# Patient Record
Sex: FEMALE | Race: WHITE | NOT HISPANIC OR LATINO | Employment: OTHER
[De-identification: names, ages, dates, MRNs, and addresses within clinical notes are randomized per-mention and may not be internally consistent; named-entity substitution may affect disease eponyms.]

---

## 2017-06-17 ENCOUNTER — HEALTH MAINTENANCE LETTER (OUTPATIENT)
Age: 42
End: 2017-06-17

## 2017-10-03 LAB — PAP SMEAR - HIM PATIENT REPORTED: NEGATIVE

## 2018-02-09 ENCOUNTER — TELEPHONE (OUTPATIENT)
Dept: FAMILY MEDICINE | Facility: CLINIC | Age: 43
End: 2018-02-09

## 2018-02-09 NOTE — TELEPHONE ENCOUNTER
"Pt calling c/o Nasal congestion, PND, production cough \"thick mucous\".  Cough is worse at night or when laying down   Symptoms for 5 days.     Denies fever, no SOB, wheezing, chest tightness or pain.     Advised Add Neti-pot or sinus wash 2-4 X day.  Mucinex for congestion.  Tylenol not to exceed 4000 mg/24 hours or ibuprofen not to exceed 3200mg/24 hours.  Warm compress to the face. Push fluids. Can add OTC Nasalcort AQ or flonase.  Can try OTC Robitussin at hs for cough if keeping you awake.     Be seen if not improving, with fever or respiratory symptoms.     Pt expressed understanding and acceptance of the plan.  Pt had no further questions at this time.  Advised can call back to clinic at any time with concerns.     Nguyen Shetty RN    "

## 2018-02-12 ENCOUNTER — OFFICE VISIT (OUTPATIENT)
Dept: FAMILY MEDICINE | Facility: CLINIC | Age: 43
End: 2018-02-12
Payer: COMMERCIAL

## 2018-02-12 ENCOUNTER — TELEPHONE (OUTPATIENT)
Dept: FAMILY MEDICINE | Facility: CLINIC | Age: 43
End: 2018-02-12

## 2018-02-12 VITALS
DIASTOLIC BLOOD PRESSURE: 68 MMHG | SYSTOLIC BLOOD PRESSURE: 100 MMHG | HEART RATE: 83 BPM | OXYGEN SATURATION: 99 % | TEMPERATURE: 98 F

## 2018-02-12 DIAGNOSIS — H10.33 ACUTE BACTERIAL CONJUNCTIVITIS OF BOTH EYES: Primary | ICD-10-CM

## 2018-02-12 DIAGNOSIS — R05.8 PRODUCTIVE COUGH: ICD-10-CM

## 2018-02-12 PROCEDURE — 99203 OFFICE O/P NEW LOW 30 MIN: CPT | Performed by: FAMILY MEDICINE

## 2018-02-12 RX ORDER — AZITHROMYCIN 250 MG/1
TABLET, FILM COATED ORAL
Qty: 6 TABLET | Refills: 0 | Status: SHIPPED | OUTPATIENT
Start: 2018-02-12 | End: 2018-03-02

## 2018-02-12 RX ORDER — POLYMYXIN B SULFATE AND TRIMETHOPRIM 1; 10000 MG/ML; [USP'U]/ML
1 SOLUTION OPHTHALMIC EVERY 4 HOURS
Qty: 10 ML | Refills: 0 | Status: SHIPPED | OUTPATIENT
Start: 2018-02-12 | End: 2018-02-18

## 2018-02-12 NOTE — NURSING NOTE
"Chief Complaint   Patient presents with     Urgent Care     Eye Problem     Possible bilateral pink eye started yestesday. Household has been having on going pink eye     URI     Severe Cough has been going on for a while       Initial /68 (BP Location: Right arm, Patient Position: Chair, Cuff Size: Adult Regular)  Pulse 83  Temp 98  F (36.7  C) (Oral)  SpO2 99% Estimated body mass index is 19.84 kg/(m^2) as calculated from the following:    Height as of 1/31/11: 5' 2.25\" (1.581 m).    Weight as of 2/9/13: 109 lb 5.6 oz (49.6 kg).  Medication Reconciliation: complete     Inge Fletcher CMA (MADDISON)        "

## 2018-02-12 NOTE — PROGRESS NOTES
SUBJECTIVE:   Chief Complaint   Patient presents with     Urgent Care     Eye Problem     Possible bilateral pink eye started yestesday. Household has been having on going pink eye     URI     Severe Cough has been going on for a while     Aria Cuevas is a 42 year old female with burning, redness, discharge and mattering in both eyes for 2 days.  No other symptoms.  No significant prior ophthalmological history. No change in visual acuity, no photophobia, no severe eye pain.      No foreign body sensation,  No allergic itching of nose or bilateral eyes.  No irritation from make-up, skin or hair care products    Patient does not  use contact lenses.  Her 2 sons developed similar pink eye in the 2 days prior to her eye symptoms.    Also has had cough, productive in AM, dry in pm without fevers/ chills/ sweats,  No headache, no bodyache.  Has been present 1 week, has some improvement, but persisting    Past Medical History:   Diagnosis Date     NO ACTIVE PROBLEMS        ALLERGIES:  No known drug allergies      Current Outpatient Prescriptions on File Prior to Visit:  NO ACTIVE MEDICATIONS      No current facility-administered medications on file prior to visit.     Social History   Substance Use Topics     Smoking status: Never Smoker     Smokeless tobacco: Not on file     Alcohol use No       Family History   Problem Relation Age of Onset     Family History Negative       CANCER Mother      skin         ROS:  CONSTITUTIONAL:NEGATIVE for fever, chills,    INTEGUMENTARY/SKIN: NEGATIVE for worrisome rashes,    EYES: NEGATIVE for vision changes    ENT/MOUTH: NEGATIVE for ear, mouth and throat problems  GI: NEGATIVE for nausea, abdominal pain,      OBJECTIVE:   /68 (BP Location: Right arm, Patient Position: Chair, Cuff Size: Adult Regular)  Pulse 83  Temp 98  F (36.7  C) (Oral)  SpO2 99%    Patient appears well, vitals signs are normal. Eyes: both eyes with findings of typical conjunctivitis noted;  erythema and discharge. PERRLA, no foreign body noted. No periorbital cellulitis. The corneas are clear and fundi normal. Visual acuity normal.     Nose:   clear discharge  Face:  No sinus tenderness  Ears: no erythema, no swelling of the bilateral ear canals.  TM's pearly bilateral  Mouth:  Pharynx, no erythema, no swelling  Neck: no cervical lymphadenopathy  Lungs:  Clear to auscultation, no wheezes, no rhonchi  Heart:  Reg. Rate and rhythm, no murmur    ASSESSMENT:   Acute bacterial conjunctivitis of both eyes     - trimethoprim-polymyxin b (POLYTRIM) ophthalmic solution; Apply 1 drop to eye every 4 hours for 6 days    Antibiotic drops per order. Hygiene discussed- frequent hand washing and to not share towels, washcloths or pillows to prevent transmission within the household.   Call prn.    Productive cough     - azithromycin (ZITHROMAX) 250 MG tablet; 2 tablets day 1 then 1 tablet daily for 4 days    Will monitor symptoms for about 3 days- if worsening will start azithromycin,  If symptoms mostly resolved she will not take the antibiotic  Has albuterol inhaler at home if needed

## 2018-02-12 NOTE — TELEPHONE ENCOUNTER
Patient calling and states her boys had pink eye and today she woke up and her eyes are crusted shut and red.  Wondering if needs visit.  Has not been seen since 1/31/11.  Advised visit.  Has noon meeting.  Found 10:15 am with Dr. Carpenter so will come right over.  Also  so wants to get drops started ASAP.  Leonora Clarke RN

## 2018-02-12 NOTE — MR AVS SNAPSHOT
After Visit Summary   2/12/2018    Aria Cuevas    MRN: [id number]           Patient Information     Date Of Birth          1975        Visit Information        Provider Department      2/12/2018 2:15 PM Jennifer Evans MD [facility]        Today's Diagnoses     Acute bacterial conjunctivitis of both eyes    -  1    Productive cough          Care Instructions      Conjunctivitis, Bacterial    You have an infection in the membranes covering the white part of the eye. This part of the eye is called the conjunctiva. The infection is called conjunctivitis. The most common symptoms of conjunctivitis include a thick, pus-like discharge from the eye, swollen eyelids, redness, eyelids sticking together upon awakening, and a gritty or scratchy feeling in the eye. Your infection was caused by bacteria. It may be treated with medicine. With treatment, the infection takes about 7 to 10 days to resolve.  Home care    Use prescribed antibiotic eye drops or ointment as directed to treat the infection.    Apply a warm compress (towel soaked in warm water) to the affected eye 3 to 4 times a day. Do this just before applying medicine to the eye.    Use a warm, wet cloth to wipe away crusting of the eyelids in the morning. This is caused by mucus drainage during the night. You may also use saline irrigating solution or artificial tears to rinse away mucus in the eye. Do not put a patch over the eye.    Wash your hands before and after touching the infected eye. This is to prevent spreading the infection to the other eye, and to other people. Don't share your towels or washcloths with others.    You may use acetaminophen or ibuprofen to control pain, unless another medicine was prescribed. (Note: If you have chronic liver or kidney disease or have ever had a stomach ulcer or gastrointestinal bleeding, talk with your doctor before using these medicines.)    Don't wear contact lenses until  your eyes have healed and all symptoms are gone.  Follow-up care  Follow up with your healthcare provider, or as advised.  When to seek medical advice  Call your healthcare provider right away if any of these occur:    Worsening vision    Increasing pain in the eye    Increasing swelling or redness of the eyelid    Redness spreading around the eye  Date Last Reviewed: 6/14/2015 2000-2017 [employer]. [address] [place] [address], [place] [address]. All rights reserved. This information is not intended as a substitute for professional medical care. Always follow your healthcare professional's instructions.        What Is Acute Bronchitis?  Acute bronchitis is when the airways in your lungs (bronchial tubes) become red and swollen (inflamed). It is usually caused by a viral infection. But it can also occur because of a bacteria or allergen. Symptoms include a cough that produces yellow or greenish mucus and can last for days or sometimes weeks.  Inside healthy lungs    Air travels in and out of the lungs through the airways. The linings of these airways produce sticky mucus. This mucus traps particles that enter the lungs. Tiny structures called cilia then sweep the particles out of the airways.     Healthy airway: Airways are normally open. Air moves in and out easily.      Healthy cilia: Tiny, hairlike cilia sweep mucus and particles up and out of the airways.   Lungs with bronchitis  Bronchitis often occurs with a cold or the flu virus. The airways become inflamed (red and swollen). There is a deep hacking cough from the extra mucus. Other symptoms may include:    Wheezing    Chest discomfort    Shortness of breath    Mild fever  A second infection, this time due to bacteria, may then occur. And airways irritated by allergens or smoke are more likely to get infected.        Inflamed airway: Inflammation and extra mucus narrow the airway, causing shortness of breath.      Impaired cilia: Extra mucus  impairs cilia, causing congestion and wheezing. Smoking makes the problem worse.   Making a diagnosis  A physical exam, health history, and certain tests help your healthcare provider make the diagnosis.  Health history  Your healthcare provider will ask you about your symptoms.  The exam  Your provider listens to your chest for signs of congestion. He or she may also check your ears, nose, and throat.  Possible tests    A sputum test for bacteria. This requires a sample of mucus from your lungs.    A nasal or throat swab. This tests to see if you have a bacterial infection.    A chest X-ray. This is done if your healthcare provider thinks you have pneumonia.    Tests to check for an underlying condition. Other tests may be done to check for things such as allergies, asthma, or COPD (chronic obstructive pulmonary disease). You may need to see a specialist for more lung function testing.  Treating a cough  The main treatment for bronchitis is easing symptoms. Avoiding smoke, allergens, and other things that trigger coughing can often help. If the infection is bacterial, you may be given antibiotics. During the illness, it's important to get plenty of sleep. To ease symptoms:    Don t smoke. Also avoid secondhand smoke.    Use a humidifier. Or try breathing in steam from a hot shower. This may help loosen mucus.    Drink a lot of water and juice. They can soothe the throat and may help thin mucus.    Sit up or use extra pillows when in bed. This helps to lessen coughing and congestion.    Ask your provider about using medicine. Ask about using cough medicine, pain and fever medicine, or a decongestant.  Antibiotics  Most cases of bronchitis are caused by cold or flu viruses. They don t need antibiotics to treat them, even if your mucus is thick and green or yellow. Antibiotics don t treat viral illness and antibiotics have not been shown to have any benefit in cases of acute bronchitis. Taking antibiotics when they  are not needed increases your risk of getting an infection later that is antibiotic-resistant. Antibiotics can also cause severe cases of diarrhea that require other antibiotics to treat.  It is important that you accept your healthcare provider's opinion to not use antibiotics. Your provider will prescribe antibiotics if the infection is caused by bacteria. If they are prescribed:    Take all of the medicine. Take the medicine until it is used up, even if symptoms have improved. If you don t, the bronchitis may come back.    Take the medicines as directed. For instance, some medicines should be taken with food.    Ask about side effects. Ask your provider or pharmacist what side effects are common, and what to do about them.  Follow-up care  You should see your provider again in 2 to 3 weeks. By this time, symptoms should have improved. An infection that lasts longer may mean you have a more serious problem.  Prevention    Avoid tobacco smoke. If you smoke, quit. Stay away from smoky places. Ask friends and family not to smoke around you, or in your home or car.    Get checked for allergies.    Ask your provider about getting a yearly flu shot. Also ask about pneumococcal or pneumonia shots.    Wash your hands often. This helps reduce the chance of picking up viruses that cause colds and flu.  Call your healthcare provider if:    Symptoms worsen, or you have new symptoms    Breathing problems worsen or  become severe    Symptoms don t get better within a week, or within 3 days of taking antibiotics   Date Last Reviewed: 2/1/2017 2000-2017 [employer]. [address] [place] [address], [place] [address]. All rights reserved. This information is not intended as a substitute for professional medical care. Always follow your healthcare professional's instructions.                Follow-ups after your visit        Who to contact     If you have questions or need follow up information about today's clinic visit or  your schedule please contact [facility] directly at [phone].  Normal or non-critical lab and imaging results will be communicated to you by MyChart, letter or phone within 4 business days after the clinic has received the results. If you do not hear from us within 7 days, please contact the clinic through [facility] or phone. If you have a critical or abnormal lab result, we will notify you by phone as soon as possible.  Submit refill requests through [facility] or call your pharmacy and they will forward the refill request to us. Please allow 3 business days for your refill to be completed.          Additional Information About Your Visit        [facility] Information     [facility] gives you secure access to your electronic health record. If you see a primary care provider, you can also send messages to your care team and make appointments. If you have questions, please call your primary care clinic.  If you do not have a primary care provider, please call [phone] and they will assist you.        Care EveryWhere ID     This is your Care EveryWhere ID. This could be used by other organizations to access your [place] medical records  [id number]        Your Vitals Were     Pulse Temperature Pulse Oximetry             83 98  F (36.7  C) (Oral) 99%          Blood Pressure from Last 3 Encounters:   02/12/18 100/68   02/09/13 99/71   01/31/11 90/60    Weight from Last 3 Encounters:   02/09/13 109 lb 5.6 oz (49.6 kg)   01/31/11 105 lb (47.6 kg)   06/18/10 105 lb (47.6 kg)              Today, you had the following     No orders found for display         Today's Medication Changes          These changes are accurate as of 2/12/18  2:37 PM.  If you have any questions, ask your nurse or doctor.               Start taking these medicines.        Dose/Directions    azithromycin 250 MG tablet   Commonly known as:  ZITHROMAX   Used for:  Productive cough   Started by:  Jennifer Evans MD        2 tablets day  1 then 1 tablet daily for 4 days   Quantity:  6 tablet   Refills:  0       trimethoprim-polymyxin b ophthalmic solution   Commonly known as:  POLYTRIM   Used for:  Acute bacterial conjunctivitis of both eyes   Started by:  Jennifer Evans MD        Dose:  1 drop   Apply 1 drop to eye every 4 hours for 6 days   Quantity:  10 mL   Refills:  0            Where to get your medicines      These medications were sent to [place] [address] [place] [facility] [place] [address] [facility]  [address] [facility] [address]    Hours:  Tech issues with their phone system Phone:  [phone]     trimethoprim-polymyxin b ophthalmic solution         Some of these will need a paper prescription and others can be bought over the counter.  Ask your nurse if you have questions.     Bring a paper prescription for each of these medications     azithromycin 250 MG tablet                Primary Care Provider Office Phone # Fax #    Boogie Miller [phone]       [facility] [address] [facility] [address]        Equal Access to Services     MORGAN WILL AH: Hadii aad ku hadasho Soomaali, waaxda luqadaha, qaybta kaalmada adeegyada, waxay idiin hayaan adeeg kharash la'akuan ah. So [facility] [phone].    ATENCIÓN: Si habla español, tiene a torres disposición servicios gratuitos de asistencia lingüística. [facility] [phone].    We comply with applicable federal civil rights laws and [place] laws. We do not discriminate on the basis of race, color, national origin, age, disability, sex, sexual orientation, or gender identity.            Thank you!     Thank you for choosing [facility]  for your care. Our goal is always to provide you with excellent care. Hearing back from our patients is one way we can continue to improve our services. Please take a few minutes to complete the written survey that you may receive in the mail after your visit with us. Thank you!             Your Updated Medication  List - Protect others around you: Learn how to safely use, store and throw away your medicines at www.disposemymeds.org.          This list is accurate as of 2/12/18  2:37 PM.  Always use your most recent med list.                   Brand Name Dispense Instructions for use Diagnosis    azithromycin 250 MG tablet    ZITHROMAX    6 tablet    2 tablets day 1 then 1 tablet daily for 4 days    Productive cough       NO ACTIVE MEDICATIONS           trimethoprim-polymyxin b ophthalmic solution    POLYTRIM    10 mL    Apply 1 drop to eye every 4 hours for 6 days    Acute bacterial conjunctivitis of both eyes

## 2018-02-12 NOTE — PATIENT INSTRUCTIONS
Conjunctivitis, Bacterial    You have an infection in the membranes covering the white part of the eye. This part of the eye is called the conjunctiva. The infection is called conjunctivitis. The most common symptoms of conjunctivitis include a thick, pus-like discharge from the eye, swollen eyelids, redness, eyelids sticking together upon awakening, and a gritty or scratchy feeling in the eye. Your infection was caused by bacteria. It may be treated with medicine. With treatment, the infection takes about 7 to 10 days to resolve.  Home care    Use prescribed antibiotic eye drops or ointment as directed to treat the infection.    Apply a warm compress (towel soaked in warm water) to the affected eye 3 to 4 times a day. Do this just before applying medicine to the eye.    Use a warm, wet cloth to wipe away crusting of the eyelids in the morning. This is caused by mucus drainage during the night. You may also use saline irrigating solution or artificial tears to rinse away mucus in the eye. Do not put a patch over the eye.    Wash your hands before and after touching the infected eye. This is to prevent spreading the infection to the other eye, and to other people. Don't share your towels or washcloths with others.    You may use acetaminophen or ibuprofen to control pain, unless another medicine was prescribed. (Note: If you have chronic liver or kidney disease or have ever had a stomach ulcer or gastrointestinal bleeding, talk with your doctor before using these medicines.)    Don't wear contact lenses until your eyes have healed and all symptoms are gone.  Follow-up care  Follow up with your healthcare provider, or as advised.  When to seek medical advice  Call your healthcare provider right away if any of these occur:    Worsening vision    Increasing pain in the eye    Increasing swelling or redness of the eyelid    Redness spreading around the eye  Date Last Reviewed: 6/14/2015 2000-2017 [employer]. [address] [place] [address], [place] [address]. All rights reserved. This information is not intended as a substitute for professional medical care. Always follow your healthcare professional's instructions.        What Is Acute Bronchitis?  Acute bronchitis is when the airways in your lungs (bronchial tubes) become red and swollen (inflamed). It is usually caused by a viral infection. But it can also occur because of a bacteria or allergen. Symptoms include a cough that produces yellow or greenish mucus and can last for days or sometimes weeks.  Inside healthy lungs    Air travels in and out of the lungs through the airways. The linings of these airways produce sticky mucus. This mucus traps particles that enter the lungs. Tiny structures called cilia then sweep the particles out of the airways.     Healthy airway: Airways are normally open. Air moves in and out easily.      Healthy cilia: Tiny, hairlike cilia sweep mucus and particles up and out of the airways.   Lungs with bronchitis  Bronchitis often occurs with a cold or the flu virus. The airways become inflamed (red and swollen). There is a deep hacking cough from the extra mucus. Other symptoms may include:    Wheezing    Chest discomfort    Shortness of breath    Mild fever  A second infection, this time due to bacteria, may then occur. And airways irritated by allergens or smoke are more likely to get infected.        Inflamed airway: Inflammation and extra mucus narrow the airway, causing shortness of breath.      Impaired cilia: Extra mucus impairs cilia, causing congestion and wheezing. Smoking makes the problem worse.   Making a diagnosis  A physical exam, health history, and certain tests help your healthcare provider make the diagnosis.  Health history  Your healthcare provider will ask you about your symptoms.  The exam  Your provider listens to your chest for signs of congestion. He or she may also check your ears, nose, and  throat.  Possible tests    A sputum test for bacteria. This requires a sample of mucus from your lungs.    A nasal or throat swab. This tests to see if you have a bacterial infection.    A chest X-ray. This is done if your healthcare provider thinks you have pneumonia.    Tests to check for an underlying condition. Other tests may be done to check for things such as allergies, asthma, or COPD (chronic obstructive pulmonary disease). You may need to see a specialist for more lung function testing.  Treating a cough  The main treatment for bronchitis is easing symptoms. Avoiding smoke, allergens, and other things that trigger coughing can often help. If the infection is bacterial, you may be given antibiotics. During the illness, it's important to get plenty of sleep. To ease symptoms:    Don t smoke. Also avoid secondhand smoke.    Use a humidifier. Or try breathing in steam from a hot shower. This may help loosen mucus.    Drink a lot of water and juice. They can soothe the throat and may help thin mucus.    Sit up or use extra pillows when in bed. This helps to lessen coughing and congestion.    Ask your provider about using medicine. Ask about using cough medicine, pain and fever medicine, or a decongestant.  Antibiotics  Most cases of bronchitis are caused by cold or flu viruses. They don t need antibiotics to treat them, even if your mucus is thick and green or yellow. Antibiotics don t treat viral illness and antibiotics have not been shown to have any benefit in cases of acute bronchitis. Taking antibiotics when they are not needed increases your risk of getting an infection later that is antibiotic-resistant. Antibiotics can also cause severe cases of diarrhea that require other antibiotics to treat.  It is important that you accept your healthcare provider's opinion to not use antibiotics. Your provider will prescribe antibiotics if the infection is caused by bacteria. If they are prescribed:    Take all of  the medicine. Take the medicine until it is used up, even if symptoms have improved. If you don t, the bronchitis may come back.    Take the medicines as directed. For instance, some medicines should be taken with food.    Ask about side effects. Ask your provider or pharmacist what side effects are common, and what to do about them.  Follow-up care  You should see your provider again in 2 to 3 weeks. By this time, symptoms should have improved. An infection that lasts longer may mean you have a more serious problem.  Prevention    Avoid tobacco smoke. If you smoke, quit. Stay away from smoky places. Ask friends and family not to smoke around you, or in your home or car.    Get checked for allergies.    Ask your provider about getting a yearly flu shot. Also ask about pneumococcal or pneumonia shots.    Wash your hands often. This helps reduce the chance of picking up viruses that cause colds and flu.  Call your healthcare provider if:    Symptoms worsen, or you have new symptoms    Breathing problems worsen or  become severe    Symptoms don t get better within a week, or within 3 days of taking antibiotics   Date Last Reviewed: 2/1/2017 2000-2017 [employer]. [address] [place] [address]. All rights reserved. This information is not intended as a substitute for professional medical care. Always follow your healthcare professional's instructions.

## 2018-03-02 ENCOUNTER — OFFICE VISIT (OUTPATIENT)
Dept: FAMILY MEDICINE | Facility: CLINIC | Age: 43
End: 2018-03-02
Payer: COMMERCIAL

## 2018-03-02 VITALS
DIASTOLIC BLOOD PRESSURE: 60 MMHG | BODY MASS INDEX: 20.24 KG/M2 | SYSTOLIC BLOOD PRESSURE: 90 MMHG | TEMPERATURE: 98.1 F | HEIGHT: 62 IN | WEIGHT: 110 LBS

## 2018-03-02 DIAGNOSIS — H10.33 ACUTE BACTERIAL CONJUNCTIVITIS OF BOTH EYES: Primary | ICD-10-CM

## 2018-03-02 PROCEDURE — 99213 OFFICE O/P EST LOW 20 MIN: CPT | Performed by: NURSE PRACTITIONER

## 2018-03-02 RX ORDER — TOBRAMYCIN 3 MG/ML
1 SOLUTION/ DROPS OPHTHALMIC 4 TIMES DAILY
COMMUNITY
End: 2018-07-10

## 2018-03-02 RX ORDER — OFLOXACIN 3 MG/ML
1 SOLUTION/ DROPS OPHTHALMIC EVERY 4 HOURS
Qty: 1 BOTTLE | Refills: 0 | Status: SHIPPED | OUTPATIENT
Start: 2018-03-02 | End: 2018-07-10

## 2018-03-02 NOTE — NURSING NOTE
"Chief Complaint   Patient presents with     Conjunctivitis       Initial BP 90/60 (BP Location: Right arm, Patient Position: Chair, Cuff Size: Adult Regular)  Temp 98.1  F (36.7  C) (Oral)  Ht 5' 2.25\" (1.581 m)  Wt 110 lb (49.9 kg)  LMP 02/27/2018 (Exact Date)  Breastfeeding? No  BMI 19.96 kg/m2 Estimated body mass index is 19.96 kg/(m^2) as calculated from the following:    Height as of this encounter: 5' 2.25\" (1.581 m).    Weight as of this encounter: 110 lb (49.9 kg).  Medication Reconciliation: complete      Health Maintenance addressed:  Pap Smear    N/a    ESTELITA Azul      "

## 2018-03-02 NOTE — PROGRESS NOTES
"  SUBJECTIVE:   Aria Cuevas is a 42 year old female who presents to clinic today for the following health issues:      Eye(s) Problem  Onset: yesterday morning, but had pink eye 3 weeks ago and doesn't feel like it cleared     Description:   Location: bilateral  Pain: no   Redness: YES    Accompanying Signs & Symptoms:  Discharge/mattering: YES  Swelling: YES  Visual changes: no   Fever: no   Nasal Congestion: no   Bothered by bright lights: no     History:   Trauma: no   Foreign body exposure: no     Precipitating factors:   Wearing contacts: YES- but not today     Alleviating factors:  Improved by:     Therapies Tried and outcome: eye drops from last episode   Bilateral eye redness and was treated with Tobradex and used it for three days. Felt burning and more irritation with the medication. Has some discharge in the morning with redness.       Problem list and histories reviewed & adjusted, as indicated.  Additional history: none    Patient Active Problem List   Diagnosis     Zoster     CARDIOVASCULAR SCREENING; LDL GOAL LESS THAN 160     Past Surgical History:   Procedure Laterality Date     NO HISTORY OF SURGERY         Social History   Substance Use Topics     Smoking status: Never Smoker     Smokeless tobacco: Never Used     Alcohol use No     Family History   Problem Relation Age of Onset     Family History Negative       CANCER Mother      skin           Reviewed and updated as needed this visit by clinical staff  Tobacco  Allergies  Meds  Problems  Med Hx  Surg Hx  Fam Hx  Soc Hx        Reviewed and updated as needed this visit by Provider  Allergies  Meds  Problems  Med Hx  Surg Hx  Fam Hx         ROS:  Constitutional, HEENT, cardiovascular, pulmonary, gi and gu systems are negative, except as otherwise noted.    OBJECTIVE:     BP 90/60 (BP Location: Right arm, Patient Position: Chair, Cuff Size: Adult Regular)  Temp 98.1  F (36.7  C) (Oral)  Ht 5' 2.25\" (1.581 m)  Wt 110 lb (49.9 " kg)  LMP 02/27/2018 (Exact Date)  Breastfeeding? No  BMI 19.96 kg/m2  Body mass index is 19.96 kg/(m^2).  GENERAL: healthy, alert and no distress  EYES: erythematous conjunctiva left is worse than right.   PSYCH: mentation appears normal, affect normal/bright        ASSESSMENT/PLAN:             1. Acute bacterial conjunctivitis of both eyes  Encouraged good hand hygiene, washing linen on bed, and not having the tip of the medication bottle touch the eye. Use according to directions and complete entire course. If no improvement, will need to see opthalmology.   - ofloxacin (OCUFLOX) 0.3 % ophthalmic solution; Apply 1 drop to eye every 4 hours  Dispense: 1 Bottle; Refill: 0        Linda Yi NP  [facility]

## 2018-03-02 NOTE — MR AVS SNAPSHOT
"              After Visit Summary   3/2/2018    Aria Cuevas    MRN: [id number]           Patient Information     Date Of Birth          1975        Visit Information        Provider Department      3/2/2018 2:45 PM Linda Yi NP [facility]        Today's Diagnoses     Acute bacterial conjunctivitis of both eyes    -  1       Follow-ups after your visit        Who to contact     If you have questions or need follow up information about today's clinic visit or your schedule please contact [facility] directly at [phone].  Normal or non-critical lab and imaging results will be communicated to you by [employer], letter or phone within 4 business days after the clinic has received the results. If you do not hear from us within 7 days, please contact the clinic through [employer] or phone. If you have a critical or abnormal lab result, we will notify you by phone as soon as possible.  Submit refill requests through [email or web address] or call your pharmacy and they will forward the refill request to us. Please allow 3 business days for your refill to be completed.          Additional Information About Your Visit        MyChart Information     [email or web address] gives you secure access to your electronic health record. If you see a primary care provider, you can also send messages to your care team and make appointments. If you have questions, please call your primary care clinic.  If you do not have a primary care provider, please call [phone] and they will assist you.        Care EveryWhere ID     This is your Care EveryWhere ID. This could be used by other organizations to access your [facility] medical records  [id number]        Your Vitals Were     Temperature Height Last Period Breastfeeding? BMI (Body Mass Index)       98.1  F (36.7  C) (Oral) 5' 2.25\" (1.581 m) 02/27/2018 (Exact Date) No 19.96 kg/m2        Blood Pressure from Last 3 Encounters:   03/02/18 90/60   02/12/18 100/68 "   02/09/13 99/71    Weight from Last 3 Encounters:   03/02/18 110 lb (49.9 kg)   02/09/13 109 lb 5.6 oz (49.6 kg)   01/31/11 105 lb (47.6 kg)              Today, you had the following     No orders found for display         Today's Medication Changes          These changes are accurate as of 3/2/18  3:12 PM.  If you have any questions, ask your nurse or doctor.               Start taking these medicines.        Dose/Directions    ofloxacin 0.3 % ophthalmic solution   Commonly known as:  OCUFLOX   Used for:  Acute bacterial conjunctivitis of both eyes   Started by:  Linda Yi NP        Dose:  1 drop   Apply 1 drop to eye every 4 hours   Quantity:  1 Bottle   Refills:  0            Where to get your medicines      These medications were sent to [place] [id number] [address]    Hours:  Tech issues with their phone system Phone:  [phone]     ofloxacin 0.3 % ophthalmic solution                Primary Care Provider Office Phone # Fax #    Boogie Miller [phone]       [facility] [address] [facility] [address]        Equal Access to Services     MORGAN WILL AH: Hadii nathan jordan hadasho Soomaali, waaxda luqadaha, qaybta kaalmada adeegyada, yamilet barba. So [facility] [phone].    ATENCIÓN: Si habla español, tiene a torres disposición servicios gratuitos de asistencia lingüística. Llame al [phone].    We comply with applicable federal civil rights laws and [place] laws. We do not discriminate on the basis of race, color, national origin, age, disability, sex, sexual orientation, or gender identity.            Thank you!     Thank you for choosing [facility]  for your care. Our goal is always to provide you with excellent care. Hearing back from our patients is one way we can continue to improve our services. Please take a few minutes to complete the written survey  that you may receive in the mail after your visit with us. Thank you!             Your Updated Medication List - Protect others around you: Learn how to safely use, store and throw away your medicines at www.disposemymeds.org.          This list is accurate as of 3/2/18  3:12 PM.  Always use your most recent med list.                   Brand Name Dispense Instructions for use Diagnosis    NO ACTIVE MEDICATIONS           ofloxacin 0.3 % ophthalmic solution    OCUFLOX    1 Bottle    Apply 1 drop to eye every 4 hours    Acute bacterial conjunctivitis of both eyes       tobramycin 0.3 % ophthalmic solution    TOBREX     1 drop 4 times daily

## 2018-05-22 ENCOUNTER — TELEPHONE (OUTPATIENT)
Dept: FAMILY MEDICINE | Facility: CLINIC | Age: 43
End: 2018-05-22

## 2018-05-22 NOTE — TELEPHONE ENCOUNTER
Panel Management Review      Patient has the following on her problem list: None      Composite cancer screening  Chart review shows that this patient is due/due soon for the following Pap Smear  Summary:    Patient is due/failing the following:   PAP    Action needed:   Patient needs office visit for physical w/pap.    Type of outreach:    Phone, spoke to patient.  pt uses [facility] last pap Feb 2018 normal  02/02/2018    Questions for provider review:    None                                                                                                                                    ESTELITA Azul       Chart routed to  .

## 2018-07-10 ENCOUNTER — OFFICE VISIT (OUTPATIENT)
Dept: FAMILY MEDICINE | Facility: CLINIC | Age: 43
End: 2018-07-10
Payer: COMMERCIAL

## 2018-07-10 VITALS
TEMPERATURE: 97.9 F | DIASTOLIC BLOOD PRESSURE: 75 MMHG | OXYGEN SATURATION: 100 % | SYSTOLIC BLOOD PRESSURE: 108 MMHG | WEIGHT: 108.13 LBS | RESPIRATION RATE: 16 BRPM | BODY MASS INDEX: 19.62 KG/M2 | HEART RATE: 60 BPM

## 2018-07-10 DIAGNOSIS — Z71.84 TRAVEL ADVICE ENCOUNTER: Primary | ICD-10-CM

## 2018-07-10 PROCEDURE — 90632 HEPA VACCINE ADULT IM: CPT | Performed by: FAMILY MEDICINE

## 2018-07-10 PROCEDURE — 90471 IMMUNIZATION ADMIN: CPT | Performed by: FAMILY MEDICINE

## 2018-07-10 PROCEDURE — 90472 IMMUNIZATION ADMIN EACH ADD: CPT | Performed by: FAMILY MEDICINE

## 2018-07-10 PROCEDURE — 90715 TDAP VACCINE 7 YRS/> IM: CPT | Performed by: FAMILY MEDICINE

## 2018-07-10 PROCEDURE — 90691 TYPHOID VACCINE IM: CPT | Performed by: FAMILY MEDICINE

## 2018-07-10 PROCEDURE — 99402 PREV MED CNSL INDIV APPRX 30: CPT | Mod: 25 | Performed by: FAMILY MEDICINE

## 2018-07-10 RX ORDER — AZITHROMYCIN 500 MG/1
TABLET, FILM COATED ORAL
Qty: 3 TABLET | Refills: 0 | Status: SHIPPED | OUTPATIENT
Start: 2018-07-10 | End: 2019-11-12

## 2018-07-10 NOTE — MR AVS SNAPSHOT
After Visit Summary   7/10/2018    Aria Cuevas    MRN: [id number]           Patient Information     Date Of Birth          1975        Visit Information        Provider Department      7/10/2018 2:30 PM Tawanna Villagomez MD [facility]        Today's Diagnoses     Travel advice encounter    -  1      Care Instructions    See travel packet provided  Recommend ultrathon (mosquito repellant), pepto bismol and imodium  Also bring hand  and sun screen with you.  Safe Travels           Follow-ups after your visit        Who to contact     If you have questions or need follow up information about today's clinic visit or your schedule please contact [facility] directly at [phone].  Normal or non-critical lab and imaging results will be communicated to you by MyChart, letter or phone within 4 business days after the clinic has received the results. If you do not hear from us within 7 days, please contact the clinic through MyChart or phone. If you have a critical or abnormal lab result, we will notify you by phone as soon as possible.  Submit refill requests through Jebbit or call your pharmacy and they will forward the refill request to us. Please allow 3 business days for your refill to be completed.          Additional Information About Your Visit        MyChart Information     Jebbit gives you secure access to your electronic health record. If you see a primary care provider, you can also send messages to your care team and make appointments. If you have questions, please call your primary care clinic.  If you do not have a primary care provider, please call [phone] and they will assist you.        Care EveryWhere ID     This is your Care EveryWhere ID. This could be used by other organizations to access your [facility] medical records  [id number]        Your Vitals Were     Pulse Temperature Respirations Last Period Pulse  Oximetry BMI (Body Mass Index)    60 97.9  F (36.6  C) (Oral) 16 07/07/2018 (Exact Date) 100% 19.62 kg/m2       Blood Pressure from Last 3 Encounters:   07/10/18 108/75   03/02/18 90/60   02/12/18 100/68    Weight from Last 3 Encounters:   07/10/18 108 lb 2 oz (49 kg)   03/02/18 110 lb (49.9 kg)   02/09/13 109 lb 5.6 oz (49.6 kg)              Today, you had the following     No orders found for display         Today's Medication Changes          These changes are accurate as of 7/10/18  3:13 PM.  If you have any questions, ask your nurse or doctor.               Start taking these medicines.        Dose/Directions    azithromycin 500 MG tablet   Commonly known as:  ZITHROMAX   Used for:  Travel advice encounter   Started by:  Tawanna Villagomez MD        Take 1 tablet daily x 3 days   Quantity:  3 tablet   Refills:  0         Stop taking these medicines if you haven't already. Please contact your care team if you have questions.     NO ACTIVE MEDICATIONS   Stopped by:  Tawanna Villagomez MD           ofloxacin 0.3 % ophthalmic solution   Commonly known as:  OCUFLOX   Stopped by:  Tawanna Villagomez MD           tobramycin 0.3 % ophthalmic solution   Commonly known as:  TOBREX   Stopped by:  Tawanna Villagomez MD                Where to get your medicines      These medications were sent to [place] [address] [place] [address] [address]    Hours:  Tech issues with their phone system Phone:  [phone]     azithromycin 500 MG tablet                Primary Care Provider Fax #    Physician No Ref-Primary [phone]       No address on file        Equal Access to Services     MORGAN WILL : Roman Connors, delaney theodore, yamilet garcia. So [facility] [phone].    ATENCIÓN: Si habla español, tiene a torres disposición servicios gratuitos de asistencia  lingüística. Yuval al [phone].    We comply with applicable federal civil rights laws and [place] laws. We do not discriminate on the basis of race, color, national origin, age, disability, sex, sexual orientation, or gender identity.            Thank you!     Thank you for choosing [facility]  for your care. Our goal is always to provide you with excellent care. Hearing back from our patients is one way we can continue to improve our services. Please take a few minutes to complete the written survey that you may receive in the mail after your visit with us. Thank you!             Your Updated Medication List - Protect others around you: Learn how to safely use, store and throw away your medicines at www.disposemymeds.org.          This list is accurate as of 7/10/18  3:13 PM.  Always use your most recent med list.                   Brand Name Dispense Instructions for use Diagnosis    azithromycin 500 MG tablet    ZITHROMAX    3 tablet    Take 1 tablet daily x 3 days    Travel advice encounter

## 2018-07-10 NOTE — PATIENT INSTRUCTIONS
See travel packet provided  Recommend ultrathon (mosquito repellant), pepto bismol and imodium  Also bring hand  and sun screen with you.  Safe Travels

## 2018-07-10 NOTE — PROGRESS NOTES
SUBJECTIVE: Aria uCevas , a 42 year old  female, presents for counseling and information regarding upcoming travel to [place]. Special medical concerns   include: none. She anticipates the following unusual exposures: none.       Itinerary: (List all countries)  [place]  Departure Date: 07/14/2018, Return Date: 07/21/2018  Reason for Travel (i.e. business, pleasure): mission trip  Visiting an urban or rural area? rural  Accommodations (i.e. hotel, hostel, friends, family etc.): hotel  Women - First day of your last period: 07/07/2018    IMMUNIZATION HISTORY  Have you received any vaccinations in the past 4 weeks?  No   Have you ever fainted from having your blood drawn or from an injection?  No  Have you ever had a fever reaction to a vaccination?  No  Have you had any bad reaction / side effect from any vaccination?  No  Have you ever had hepatitis A or B vaccine?  No  Do you live (or work closely with anyone who has AIDS, or any other immune disorder, or who is on chemotherapy for cancer or family history of immunodeficiency?  No  Have you received any injection of immune globulin or any blood products during the past 12 months?  No    GENERAL MEDICAL HISTORY  Do you have a medical condition that warrants maintenance meds or physician follow-up?  No  Do you have a medical condition that is stable now, but that may recur while traveling?  No  Has your spleen been removed?   No  Have you had an acute illness or a fever in the past 48 hours?  No  Are you pregnant or might you become pregnant on this trip? Any chance of pregnancy?  No  Are you breastfeeding?  No  Do you have HIV, AIDS, an AIDS-like condition, any other immune disorder, leukemia or cancer?  No  Do you have a severe combined immunodeficiency disease?  No  Have you had your thymus gland removed or a history of problems with your thymus, such as myasthenia gravis, DiGeorge syndrome, or thymoma?  No  Do you have severe  thrombocytopenia (low platelet count) or blood clotting disorder?  No  Have you ever had a convulsion, seizure, epilepsy, neurologic condition or brain infection?  No  Do you have any stomach conditions?  No  Do you have a G6PD deficiency?  No  Do you have severe renal or kidney impairment?  No  Do you have a history of psychiatric problems?  No  Do you have a problem with strange dreams and/or nightmares?  No  Do you have insomnia?  No  Do you have problems with vaginitis?  No  Do you have psoriasis?  No  Are you prone to motion sickness?  No  Have you ever had headaches, nausea, vomiting or breathing problems from altitude exposure?  No    MEDICATIONS  ARE YOU TAKING:  Steroids, prednisone, or anti-cancer drugs?  No  Antibiotics or sulfonamides?  No  Oral contraceptives?  No  Aspirin therapy? (children & adolescents)  No    ALLERGIES  ARE YOU ALLERGIC TO:  Any medications?  No  Any foods or other?  No     Patient Active Problem List   Diagnosis     Zoster     CARDIOVASCULAR SCREENING; LDL GOAL LESS THAN 160         Past Medical History:   Diagnosis Date     NO ACTIVE PROBLEMS       Immunization History   Administered Date(s) Administered     HepA-Adult 07/10/2018     TDAP Vaccine (Adacel) 07/10/2018     Typhoid IM 07/10/2018       Current Outpatient Prescriptions   Medication Sig Dispense Refill     azithromycin (ZITHROMAX) 500 MG tablet Take 1 tablet daily x 3 days 3 tablet 0     Allergies   Allergen Reactions     No Known Drug Allergies         EXAM: deferred    Immunizations discussed include: Hepatitis A, Typhoid and Tetanus/Diphtheria  Malaraia prophylaxis recommended: n/a  Symptomatic treatment for traveler's diarrhea: azithromycin     ASSESSMENT/PLAN:  (Z71.89) Travel advice encounter  (primary encounter diagnosis)  Comment:   Plan: azithromycin (ZITHROMAX) 500 MG tablet, TYPHOID        VACCINE, IM, HEPA VACCINE ADULT IM, TDAP         VACCINE (ADACEL)      I have reviewed general recommendations for safe  travel   including: food/water precautions, insect avoidance, safe sex   practices given high prevalence of HIV and other STDs,   roadway safety. Educational materials and links to the CDC   Traveler's health website have been provided.    Total time 30 minutes, greater than 50 percent in counseling   and coordination of care.    Tawanna Harris MD   [facility]

## 2018-12-21 ENCOUNTER — TELEPHONE (OUTPATIENT)
Dept: FAMILY MEDICINE | Facility: CLINIC | Age: 43
End: 2018-12-21

## 2018-12-21 NOTE — TELEPHONE ENCOUNTER
Panel Management Review      Patient has the following on her problem list: None      Composite cancer screening  Chart review shows that this patient is due/due soon for the following Pap Smear  Summary:    Patient is due/failing the following:   PAP    Action needed:   Patient needs office visit for PE/PAP.    Type of outreach:    Phone, spoke to patient.  she states had normal pap with [facility] in 10/2017    Questions for provider review:    None                                                                                                                                    Aminta Mccullough/COTY  [place]       Chart routed to none .

## 2019-05-13 ENCOUNTER — TRANSFERRED RECORDS (OUTPATIENT)
Dept: HEALTH INFORMATION MANAGEMENT | Facility: CLINIC | Age: 44
End: 2019-05-13

## 2019-05-24 ENCOUNTER — THERAPY VISIT (OUTPATIENT)
Dept: PHYSICAL THERAPY | Facility: CLINIC | Age: 44
End: 2019-05-24
Payer: COMMERCIAL

## 2019-05-24 DIAGNOSIS — M54.16 LUMBAR RADICULOPATHY: ICD-10-CM

## 2019-05-24 DIAGNOSIS — S16.1XXD STRAIN OF NECK MUSCLE, SUBSEQUENT ENCOUNTER: ICD-10-CM

## 2019-05-24 PROBLEM — S16.1XXA CERVICAL STRAIN: Status: ACTIVE | Noted: 2019-05-24

## 2019-05-24 PROCEDURE — 97110 THERAPEUTIC EXERCISES: CPT | Mod: GP | Performed by: PHYSICAL THERAPIST

## 2019-05-24 PROCEDURE — 97035 APP MDLTY 1+ULTRASOUND EA 15: CPT | Mod: GP | Performed by: PHYSICAL THERAPIST

## 2019-05-24 PROCEDURE — 97161 PT EVAL LOW COMPLEX 20 MIN: CPT | Mod: GP | Performed by: PHYSICAL THERAPIST

## 2019-05-24 ASSESSMENT — ACTIVITIES OF DAILY LIVING (ADL)
GOING_DOWN_1_FLIGHT_OF_STAIRS: NO DIFFICULTY AT ALL
ROLLING_OVER_IN_BED: NO DIFFICULTY AT ALL
HOS_ADL_ITEM_SCORE_TOTAL: 62
GETTING_INTO_AND_OUT_OF_A_BATHTUB: NO DIFFICULTY AT ALL
WALKING_APPROXIMATELY_10_MINUTES: SLIGHT DIFFICULTY
HOS_ADL_SCORE(%): 91.18
HOS_ADL_HIGHEST_POTENTIAL_SCORE: 68
STEPPING_UP_AND_DOWN_CURBS: NO DIFFICULTY AT ALL
GOING_UP_1_FLIGHT_OF_STAIRS: NO DIFFICULTY AT ALL
HOS_ADL_COUNT: 17
WALKING_INITIALLY: NO DIFFICULTY AT ALL
TWISTING/PIVOTING_ON_INVOLVED_LEG: NO DIFFICULTY AT ALL
WALKING_DOWN_STEEP_HILLS: SLIGHT DIFFICULTY
DEEP_SQUATTING: NO DIFFICULTY AT ALL
WALKING_UP_STEEP_HILLS: SLIGHT DIFFICULTY
HOW_WOULD_YOU_RATE_YOUR_CURRENT_LEVEL_OF_FUNCTION_DURING_YOUR_USUAL_ACTIVITIES_OF_DAILY_LIVING_FROM_0_TO_100_WITH_100_BEING_YOUR_LEVEL_OF_FUNCTION_PRIOR_TO_YOUR_HIP_PROBLEM_AND_0_BEING_THE_INABILITY_TO_PERFORM_ANY_OF_YOUR_USUAL_DAILY_ACTIVITIES?: 90
LIGHT_TO_MODERATE_WORK: NO DIFFICULTY AT ALL
SITTING_FOR_15_MINUTES: SLIGHT DIFFICULTY
RECREATIONAL_ACTIVITIES: SLIGHT DIFFICULTY
HEAVY_WORK: NO DIFFICULTY AT ALL
PUTTING_ON_SOCKS_AND_SHOES: NO DIFFICULTY AT ALL
STANDING_FOR_15_MINUTES: SLIGHT DIFFICULTY
GETTING_INTO_AND_OUT_OF_AN_AVERAGE_CAR: NO DIFFICULTY AT ALL
WALKING_15_MINUTES_OR_GREATER: SLIGHT DIFFICULTY

## 2019-05-24 NOTE — PROGRESS NOTES
[facility] Initial Evaluation  Subjective:  Patient describes right lateral hip pain of insidious onset approximately a year ago.  Has been worse within the last 4 months for unknown reasons locates the pain just inferior to the right iliac crest.  Pain is worse with prolonged sitting especially driving.  Also painful at night and interfering with sleep.  Patient also describes right neck pain just superior to her right clavicle.  She reports an insidious onset of pain approximately 2 months ago.  Possibly a result of weight training where weight lifting.  This pain is worse with lifting overhead, lying on her right side, and lifting arms up to take shirts on and off.    The history is provided by the patient.     Condition occurred with:  Insidious onset.    This is a new condition     Site of Pain: Just inferior to the right iliac crest.  Radiates to: Occasionally radiate right upper thigh.  Pain is described as aching and is intermittent and reported as 7/10.   Pain is worse during the night.  Symptoms are exacerbated by sitting (After activity such as prolonged walking or running) and relieved by rest.  Since onset symptoms are gradually worsening.  Special tests:  MRI (Of right hip showing labral tear).      General health as reported by patient is excellent.                  Barriers include:  None as reported by the patient.    Red flags:  None as reported by the patient.      Aria Cuevas is a 43 year old female with a cervical spine condition.  Condition occurred with:  Lifting.  Condition occurred: during recreation/sport.  This is a new condition     Site of Pain: Right neck just superior to the right clavicle.  Radiates to: Occasionally to the right shoulder.  Pain is described as aching and sharp and is intermittent and reported as 8/10.   Pain is the same all the time.  Symptoms are exacerbated by certain positions and lifting and relieved by rest.  Since onset symptoms are  unchanged.        General health as reported by patient is excellent.                  Barriers include:  None as reported by the patient.    Red flags:  None as reported by the patient.                        Objective:        Flexibility/Screens:   Positive screens:  LumbarNegative screens: Hip or Shoulder                         Cervical/Thoracic Evaluation    AROM:  AROM Cervical:    Flexion:            Within normal limits  Extension:       Within normal limits  Rotation:         Left: Within normal     Right: Within normal limits  Side Bend:      Left: Within normal limit with right neck pain     Right:  Within normal limits      Headaches: none  Cervical Myotomes:  normal                        Cervical Palpation:  : Right first rib.    Tenderness present at Right:    Scalenes    Cervical Stability/Joint Clearing:      Right positive at:  1st Rib           Shoulder Evaluation:  ROM:  AROM:    Flexion:  Right:  Painful motion    Abduction:  Right:  Painful with slight range of motion loss          Horizontal Adduction:  Right:  Painful with slight range of motion loss                  Strength:  normal                                                    Hip Evaluation  Hip PROM:  Hip PROM:  Left Hip:    Normal  Right Hip:  Normal                          Hip Strength:  Hip Strength:    Left:    Normal  Right:  Normal                          Hip Special Testing:      Right hip negative for the following special tests:  Kera or Fadir/Labrum                   Diogenes Lumbar Evaluation    Posture:  Sitting: good  Standing: good  Lordosis: WNL  Lateral Shift: no      Movement Loss:  Flexion (Flex): nil  Extension (EXT): nil  Side Glide R (SG R): nil  Side Glide L (SG L): nil and pain  Test Movements:  FIS: During: no effect  After: no effect  Pretest Movements: Right lat hip pain  Repeat FIS: During: no effect  After: no effect    EIS: During: no effect  After: no effect    Repeat EIS: During: no effect  After:  no effect      EIL:   Repeat EIL: During: no effect  After: no effect    SGIS R: During: decreases  After: no better    Repeat SGIS R: During: decreases  After: better    SGIS L: During: increases  After: no worse    Repeat SGIS L: During: increases  After: worse      Conclusion: derangement  Principle of Treatment:        Lateral: Lateral                                         ROS    Assessment/Plan:    Patient is a 43 year old female with cervical and lumbar complaints.    Patient has the following significant findings with corresponding treatment plan.                Diagnosis 1: Lumbar lateral derangement Pain -  manual therapy, self management, education, directional preference exercise and home program  Diagnosis 2: Right scalene strain with elevated first rib   Pain -  US, manual therapy, education and home program  Decreased ROM/flexibility - manual therapy, therapeutic exercise and home program  Decreased joint mobility - manual therapy, therapeutic exercise and home program    Therapy Evaluation Codes:   1) History comprised of:   Personal factors that impact the plan of care:      None.    Comorbidity factors that impact the plan of care are:      None.     Medications impacting care: None.  2) Examination of Body Systems comprised of:   Body structures and functions that impact the plan of care:      Cervical spine and Lumbar spine.   Activity limitations that impact the plan of care are:      Driving, Lifting, Sitting and Sleeping.  3) Clinical presentation characteristics are:   Stable/Uncomplicated.  4) Decision-Making    Low complexity using standardized patient assessment instrument and/or measureable assessment of functional outcome.  Cumulative Therapy Evaluation is: Low complexity.    Previous and current functional limitations:  (See Goal Flow Sheet for this information)    Short term and Long term goals: (See Goal Flow Sheet for this information)     Communication ability:  Patient appears to  be able to clearly communicate and understand verbal and written communication and follow directions correctly.  Treatment Explanation - The following has been discussed with the patient:   RX ordered/plan of care  Anticipated outcomes  Possible risks and side effects  This patient would benefit from PT intervention to resume normal activities.   Rehab potential is good.    Frequency:  1 X week, once daily  Duration:  for 6 weeks  Discharge Plan:  Achieve all LTG.  Independent in home treatment program.  Reach maximal therapeutic benefit.    Please refer to the daily flowsheet for treatment today, total treatment time and time spent performing 1:1 timed codes.

## 2019-05-24 NOTE — LETTER
[facility]  [address]  [facility] [address]  [phone]    May 24, 2019    Re: Aria Cuevas   :   1975  MRN:  [id number]   REFERRING PHYSICIAN:   Rosalio Walters    [facility]    Date of Initial Evaluation:  2019  Visits:  Rxs Used: 1  Reason for Referral:     Lumbar radiculopathy  Strain of neck muscle, subsequent encounter    EVALUATION SUMMARY    [facility] Initial Evaluation  Subjective:  Patient describes right lateral hip pain of insidious onset approximately a year ago.  Has been worse within the last 4 months for unknown reasons locates the pain just inferior to the right iliac crest.  Pain is worse with prolonged sitting especially driving.  Also painful at night and interfering with sleep.  Patient also describes right neck pain just superior to her right clavicle.  She reports an insidious onset of pain approximately 2 months ago.  Possibly a result of weight training where weight lifting.  This pain is worse with lifting overhead, lying on her right side, and lifting arms up to take shirts on and off.  The history is provided by the patient.   Condition occurred with:  Insidious onset.    This is a new condition     Site of Pain: Just inferior to the right iliac crest.  Radiates to: Occasionally radiate right upper thigh.  Pain is described as aching and is intermittent and reported as 7/10.   Pain is worse during the night.  Symptoms are exacerbated by sitting (After activity such as prolonged walking or running) and relieved by rest.  Since onset symptoms are gradually worsening.  Special tests:  MRI (Of right hip showing labral tear).  General health as reported by patient is excellent. Barriers include:  None as reported by the patient. Red flags:  None as reported by the patient.  Aria Cuevas is a 43 year old female with a cervical spine condition.  Condition occurred with:   Lifting.  Condition occurred: during recreation/sport.  This is a new condition     Site of Pain: Right neck just superior to the right clavicle.  Radiates to: Occasionally to the right shoulder.  Pain is described as aching and sharp and is intermittent and reported as 8/10.   Pain is the same all the time.  Symptoms are exacerbated by certain positions and lifting and relieved by rest.  Since onset symptoms are unchanged.        General health as reported by patient is excellent. Barriers include:  None as reported by the patient. Red flags:  None as reported by the patient.  Objective:  Flexibility/Screens:   Positive screens:  LumbarNegative screens: Hip or Shoulder     Cervical/Thoracic Evaluation  AROM:  AROM Cervical:  Flexion:            Within normal limits  Extension:       Within normal limits  Rotation:         Left: Within normal     Right: Within normal limits  Side Bend:      Left: Within normal limit with right neck pain     Right:  Within normal limits  Headaches: none  Cervical Myotomes:  normal  Cervical Palpation:  : Right first rib.  Tenderness present at Right:    Scalenes  Cervical Stability/Joint Clearing:    Right positive at:  1st Rib  Shoulder Evaluation:  ROM:  AROM:    Flexion:  Right:  Painful motion  Abduction:  Right:  Painful with slight range of motion loss  Horizontal Adduction:  Right:  Painful with slight range of motion loss  Strength:  normal  Hip Evaluation  Hip PROM:  Hip PROM:  Left Hip:    Normal  Right Hip:  Normal  Hip Strength:  Hip Strength:    Left:    Normal  Right:  Normal  Hip Special Testing:    Right hip negative for the following special tests:  Kera or Fadir/Labrum    Diogenes Lumbar Evaluation  Posture:  Sitting: good  Standing: good  Lordosis: WNL  Lateral Shift: no  Movement Loss:  Flexion (Flex): nil  Extension (EXT): nil  Side Glide R (SG R): nil  Side Glide L (SG L): nil and pain  Test Movements:  FIS: During: no effect  After: no effect  Pretest  Movements: Right lat hip pain  Repeat FIS: During: no effect  After: no effect    EIS: During: no effect  After: no effect    Repeat EIS: During: no effect  After: no effect    EIL:   Repeat EIL: During: no effect  After: no effect    SGIS R: During: decreases  After: no better    Repeat SGIS R: During: decreases  After: better    SGIS L: During: increases  After: no worse    Repeat SGIS L: During: increases  After: worse    Conclusion: derangement  Principle of Treatment:  Lateral: Lateral     Assessment/Plan:    Patient is a 43 year old female with cervical and lumbar complaints.    Patient has the following significant findings with corresponding treatment plan.                Diagnosis 1: Lumbar lateral derangement Pain -  manual therapy, self management, education, directional preference exercise and home program  Diagnosis 2: Right scalene strain with elevated first rib   Pain -  US, manual therapy, education and home program  Decreased ROM/flexibility - manual therapy, therapeutic exercise and home program  Decreased joint mobility - manual therapy, therapeutic exercise and home program    Therapy Evaluation Codes:   1) History comprised of:   Personal factors that impact the plan of care:      None.    Comorbidity factors that impact the plan of care are:      None.     Medications impacting care: None.  2) Examination of Body Systems comprised of:   Body structures and functions that impact the plan of care:      Cervical spine and Lumbar spine.   Activity limitations that impact the plan of care are:      Driving, Lifting, Sitting and Sleeping.  3) Clinical presentation characteristics are:   Stable/Uncomplicated.  4) Decision-Making    Low complexity using standardized patient assessment instrument and/or measureable assessment of functional outcome.  Cumulative Therapy Evaluation is: Low complexity.  Previous and current functional limitations:  (See Goal Flow Sheet for this information)    Short term  and Long term goals: (See Goal Flow Sheet for this information)   Communication ability:  Patient appears to be able to clearly communicate and understand verbal and written communication and follow directions correctly.  Treatment Explanation - The following has been discussed with the patient:   RX ordered/plan of care  Anticipated outcomes, Possible risks and side effects  This patient would benefit from PT intervention to resume normal activities.   Rehab potential is good.  Frequency:  1 X week, once daily  Duration:  for 6 weeks  Discharge Plan:  Achieve all LTG.  Independent in home treatment program.  Reach maximal therapeutic benefit.    Thank you for your referral.    INQUIRIES  Therapist: Martinez Ba, PT   [facility] [place]  [address] [facility] [address]  Phone: [phone]  Fax: [phone]

## 2019-05-31 ENCOUNTER — THERAPY VISIT (OUTPATIENT)
Dept: PHYSICAL THERAPY | Facility: CLINIC | Age: 44
End: 2019-05-31
Payer: COMMERCIAL

## 2019-05-31 DIAGNOSIS — S16.1XXD STRAIN OF NECK MUSCLE, SUBSEQUENT ENCOUNTER: ICD-10-CM

## 2019-05-31 DIAGNOSIS — M54.16 LUMBAR RADICULOPATHY: ICD-10-CM

## 2019-05-31 PROCEDURE — 97110 THERAPEUTIC EXERCISES: CPT | Mod: GP

## 2019-05-31 PROCEDURE — 97140 MANUAL THERAPY 1/> REGIONS: CPT | Mod: GP

## 2019-05-31 PROCEDURE — 97035 APP MDLTY 1+ULTRASOUND EA 15: CPT | Mod: GP

## 2019-06-10 ENCOUNTER — THERAPY VISIT (OUTPATIENT)
Dept: PHYSICAL THERAPY | Facility: CLINIC | Age: 44
End: 2019-06-10
Payer: COMMERCIAL

## 2019-06-10 DIAGNOSIS — S16.1XXD STRAIN OF NECK MUSCLE, SUBSEQUENT ENCOUNTER: ICD-10-CM

## 2019-06-10 DIAGNOSIS — M54.16 LUMBAR RADICULOPATHY: ICD-10-CM

## 2019-06-10 PROCEDURE — 97110 THERAPEUTIC EXERCISES: CPT | Mod: GP

## 2019-06-10 PROCEDURE — 97035 APP MDLTY 1+ULTRASOUND EA 15: CPT | Mod: GP

## 2019-06-17 ENCOUNTER — THERAPY VISIT (OUTPATIENT)
Dept: PHYSICAL THERAPY | Facility: CLINIC | Age: 44
End: 2019-06-17
Payer: COMMERCIAL

## 2019-06-17 DIAGNOSIS — M54.16 LUMBAR RADICULOPATHY: ICD-10-CM

## 2019-06-17 DIAGNOSIS — S16.1XXD STRAIN OF NECK MUSCLE, SUBSEQUENT ENCOUNTER: ICD-10-CM

## 2019-06-17 PROCEDURE — 97035 APP MDLTY 1+ULTRASOUND EA 15: CPT | Mod: GP

## 2019-06-17 PROCEDURE — 97110 THERAPEUTIC EXERCISES: CPT | Mod: GP

## 2019-06-17 PROCEDURE — 97140 MANUAL THERAPY 1/> REGIONS: CPT | Mod: GP

## 2019-06-24 ENCOUNTER — THERAPY VISIT (OUTPATIENT)
Dept: PHYSICAL THERAPY | Facility: CLINIC | Age: 44
End: 2019-06-24
Payer: COMMERCIAL

## 2019-06-24 DIAGNOSIS — M54.16 LUMBAR RADICULOPATHY: ICD-10-CM

## 2019-06-24 DIAGNOSIS — S16.1XXD STRAIN OF NECK MUSCLE, SUBSEQUENT ENCOUNTER: ICD-10-CM

## 2019-06-24 PROCEDURE — 97110 THERAPEUTIC EXERCISES: CPT | Mod: GP | Performed by: PHYSICAL THERAPIST

## 2019-06-28 ENCOUNTER — OFFICE VISIT (OUTPATIENT)
Dept: FAMILY MEDICINE | Facility: CLINIC | Age: 44
End: 2019-06-28
Payer: COMMERCIAL

## 2019-06-28 VITALS
WEIGHT: 109.3 LBS | HEIGHT: 62 IN | HEART RATE: 74 BPM | OXYGEN SATURATION: 98 % | RESPIRATION RATE: 16 BRPM | DIASTOLIC BLOOD PRESSURE: 73 MMHG | BODY MASS INDEX: 20.11 KG/M2 | SYSTOLIC BLOOD PRESSURE: 107 MMHG | TEMPERATURE: 98.9 F

## 2019-06-28 DIAGNOSIS — Z71.84 ENCOUNTER FOR COUNSELING FOR TRAVEL: Primary | ICD-10-CM

## 2019-06-28 DIAGNOSIS — Z23 NEED FOR HEPATITIS A VACCINATION: ICD-10-CM

## 2019-06-28 PROCEDURE — 99401 PREV MED CNSL INDIV APPRX 15: CPT | Mod: 25 | Performed by: PHYSICIAN ASSISTANT

## 2019-06-28 PROCEDURE — 90471 IMMUNIZATION ADMIN: CPT | Performed by: PHYSICIAN ASSISTANT

## 2019-06-28 PROCEDURE — 90632 HEPA VACCINE ADULT IM: CPT | Performed by: PHYSICIAN ASSISTANT

## 2019-06-28 RX ORDER — AZITHROMYCIN 500 MG/1
500 TABLET, FILM COATED ORAL DAILY
Qty: 3 TABLET | Refills: 0 | Status: SHIPPED | OUTPATIENT
Start: 2019-06-28 | End: 2019-11-12

## 2019-06-28 RX ORDER — ATOVAQUONE AND PROGUANIL HYDROCHLORIDE 250; 100 MG/1; MG/1
1 TABLET, FILM COATED ORAL DAILY
Qty: 15 TABLET | Refills: 0 | Status: SHIPPED | OUTPATIENT
Start: 2019-06-28 | End: 2019-11-12

## 2019-06-28 ASSESSMENT — MIFFLIN-ST. JEOR: SCORE: 1112.28

## 2019-06-28 NOTE — PATIENT INSTRUCTIONS
See travel packet provided  Recommend ultrathon, pepto bismol and imodium  Also bring hand  and sun screen with you.  Safe Travels       Today June 28, 2019 you received the    Hepatitis A Vaccine - This is your final dose.  .    These appointments can be made as a NURSE ONLY visit.    **It is very important for the vaccinations to be given on the scheduled day(s), this helps ensure you receive the full effectiveness of the vaccine.**    Please call [facility] with any questions [phone]    Thank you for visiting [facility]

## 2019-06-28 NOTE — PROGRESS NOTES
SUBJECTIVE: Aria Cuevas , a 43 year old  female, presents for counseling and information regarding upcoming travel to [facility]. Special medical concerns include: none. She anticipates the following unusual exposures: none.    Itinerary:  [facility]     Departure Date: 7/27/19 Return date: 8/10/19    Reason for travel (i.e. Business, pleasure): pleasure    Visiting an urban or rural area?:  Hotel and resort     Accommodations (i.e. hotel, hostel, friends, family, etc):      Women - First day of your last period: 6/15/19    IMMUNIZATION HISTORY  Have you received any vaccinations in the past 4 weeks?  No  Have you ever fainted from having your blood drawn or from an injection?  No  Have you ever had a fever reaction to vaccination?  No  Have you ever had any bad reaction or side effect from any vaccination?  No  Have you ever had hepatitis A or B vaccine?  Yes  Do you live (or work closely) with anyone who has AIDS, an AIDS-like condition, any other immune disorder or who is on chemotherapy for cancer?  No  Have you received any injection of immune globulin or any blood products during the past 12 months?  No    GENERAL MEDICAL HISTORY  Do you have a medical condition that warrants maintenance medication or physician follow-up?  No  Do you have a medical condition that is stable now, but that may recur while traveling?  No  Has your spleen been removed?  No  Have you had an acute illness or a fever in the past 48 hours?  No  Are you pregnant, or might you become pregnant on this trip?  Any chance of pregnancy?  No  Are you breastfeeding?  No  Do you have HIV, AIDS, an AIDS-like condition, any other immune disorder, leukemia or cancer?  No  Do you have a severe combined immunodeficiency disease?  No  Have you had your thymus gland removed or history of problems with your thymus, such as myasthenia gravis, DiGeorge syndrome, or thymoma?  No    Do you have severe thrombocytopenia (low platelet count) or a  coagulation disorder?  No  Have you ever had a convulsion, seizure, epilepsy, neurologic condition or brain infection?  No  Do you have any stomach conditions?  No  Do you have a G6PD deficiency?  No  Do you have severe renal or kidney impairment?  No  Do you have a history of psychiatric problems?  No  Do you have a problem with strange dreams and/or nightmares?  No  Do you have insomnia?  No  Do you have problems with vaginitis?  No  Do you have psoriasis?  No  Are you prone to motion sickness?  No  Have you ever had headaches, nausea, vomiting, or breathing problems from altitude exposure?  No      Past Medical History:   Diagnosis Date     NO ACTIVE PROBLEMS       Immunization History   Administered Date(s) Administered     HepA-Adult 07/10/2018     TDAP Vaccine (Adacel) 07/10/2018     Typhoid IM 07/10/2018       Current Outpatient Medications   Medication Sig Dispense Refill     azithromycin (ZITHROMAX) 500 MG tablet Take 1 tablet daily x 3 days 3 tablet 0     Allergies   Allergen Reactions     No Known Drug Allergies         EXAM: deferred    Immunizations discussed include: Hepatitis A  Malaria prophylaxis recommended: Malarone  Symptomatic treatment for traveler's diarrhea: bismuth subsalicylate, loperamide/diphenoxylate and azithromycin    ASSESSMENT/PLAN:    (Z71.89) Encounter for counseling for travel  (primary encounter diagnosis)    Comment: Hepatitis A vaccines today. Patient will return or follow-up with PCP as needed. Prophylaxis given for Traveler's diarrhea and Malaria. All questions were answered.     Plan: atovaquone-proguanil (MALARONE) 250-100 MG         tablet, azithromycin (ZITHROMAX) 500 MG tablet            (Z23) Need for hepatitis A vaccination  Comment:   Plan: HEPA VACCINE ADULT IM              I have reviewed general recommendations for safe travel   including: food/water precautions, insect avoidance, safe sex   practices given high prevalence of HIV and other STDs,   roadway safety.  Educational materials and links to the [facility]   Traveler's health website have been provided.    Total time 15 minutes, greater than 50 percent in counseling   and coordination of care.

## 2019-07-08 ENCOUNTER — THERAPY VISIT (OUTPATIENT)
Dept: PHYSICAL THERAPY | Facility: CLINIC | Age: 44
End: 2019-07-08
Payer: COMMERCIAL

## 2019-07-08 DIAGNOSIS — M54.16 LUMBAR RADICULOPATHY: ICD-10-CM

## 2019-07-08 DIAGNOSIS — S16.1XXD STRAIN OF NECK MUSCLE, SUBSEQUENT ENCOUNTER: ICD-10-CM

## 2019-07-08 PROCEDURE — 97035 APP MDLTY 1+ULTRASOUND EA 15: CPT | Mod: GP | Performed by: PHYSICAL THERAPIST

## 2019-07-08 PROCEDURE — 97110 THERAPEUTIC EXERCISES: CPT | Mod: GP | Performed by: PHYSICAL THERAPIST

## 2019-07-08 PROCEDURE — 97140 MANUAL THERAPY 1/> REGIONS: CPT | Mod: GP | Performed by: PHYSICAL THERAPIST

## 2019-07-17 ENCOUNTER — THERAPY VISIT (OUTPATIENT)
Dept: PHYSICAL THERAPY | Facility: CLINIC | Age: 44
End: 2019-07-17
Payer: COMMERCIAL

## 2019-07-17 DIAGNOSIS — M54.16 LUMBAR RADICULOPATHY: ICD-10-CM

## 2019-07-17 DIAGNOSIS — S16.1XXD STRAIN OF NECK MUSCLE, SUBSEQUENT ENCOUNTER: ICD-10-CM

## 2019-07-17 PROCEDURE — 97035 APP MDLTY 1+ULTRASOUND EA 15: CPT | Mod: GP | Performed by: PHYSICAL THERAPIST

## 2019-07-17 PROCEDURE — 97140 MANUAL THERAPY 1/> REGIONS: CPT | Mod: GP | Performed by: PHYSICAL THERAPIST

## 2019-07-24 ENCOUNTER — THERAPY VISIT (OUTPATIENT)
Dept: PHYSICAL THERAPY | Facility: CLINIC | Age: 44
End: 2019-07-24
Payer: COMMERCIAL

## 2019-07-24 DIAGNOSIS — S16.1XXD STRAIN OF NECK MUSCLE, SUBSEQUENT ENCOUNTER: ICD-10-CM

## 2019-07-24 DIAGNOSIS — M54.16 LUMBAR RADICULOPATHY: ICD-10-CM

## 2019-07-24 PROCEDURE — 97035 APP MDLTY 1+ULTRASOUND EA 15: CPT | Mod: GP

## 2019-07-24 PROCEDURE — 97140 MANUAL THERAPY 1/> REGIONS: CPT | Mod: GP

## 2019-07-24 PROCEDURE — 97110 THERAPEUTIC EXERCISES: CPT | Mod: GP

## 2019-08-08 ENCOUNTER — MYC MEDICAL ADVICE (OUTPATIENT)
Dept: FAMILY MEDICINE | Facility: CLINIC | Age: 44
End: 2019-08-08

## 2019-08-16 ENCOUNTER — THERAPY VISIT (OUTPATIENT)
Dept: PHYSICAL THERAPY | Facility: CLINIC | Age: 44
End: 2019-08-16
Payer: COMMERCIAL

## 2019-08-16 DIAGNOSIS — M54.16 LUMBAR RADICULOPATHY: ICD-10-CM

## 2019-08-16 DIAGNOSIS — S16.1XXD STRAIN OF NECK MUSCLE, SUBSEQUENT ENCOUNTER: ICD-10-CM

## 2019-08-16 PROCEDURE — 97110 THERAPEUTIC EXERCISES: CPT | Mod: GP | Performed by: PHYSICAL THERAPIST

## 2019-08-16 NOTE — LETTER
[facility] ATHLETIC [facility]  [address]  [facility] [address]  [phone]    2019    Re: Aria Cuevas   :   1975  MRN:  [id number]   REFERRING PHYSICIAN:   Rosalio Walters    [facility]    Date of Initial Evaluation:  2019  Visits:  Rxs Used: 9  Reason for Referral:     Lumbar radiculopathy  Strain of neck muscle, subsequent encounter    Assessment/Plan:    PROGRESS  REPORT    Progress reporting period is from 19 to 19 (9 visits).       SUBJECTIVE  Subjective changes noted by patient:    Subjective: Pt reports that she had no improvement in her right hip pain following an injection given in July, but she had no hip pain for about 2 weeks while on vacation in the [facility] [place].  Her pain returned the day after returning home from the vacation.  Felt in the morning and with sitting.  Locates the pain in the area of the illiac crest about 2 inches behind the ASIS.  Is also tender to touch in that area.  She states that she has had little to no right neck/shoulder pain for the last 2-3 weeks.  She can lift her arms above her head and carry things with the right arm without pain now.      Current pain level is NA  .     Previous pain level was  Initial Pain level: 8/10.   Changes in function:  Yes (See Goal flowsheet attached for changes in current functional level)  Adverse reaction to treatment or activity: None    OBJECTIVE  Changes noted in objective findings:    Objective: Shoulder and neck ROM are full and painfree.  MMT of neck and shoulder are normal and painfree.  Repeated movements exam of the low back: Flexion is full and no sxs, repeated ext produces right LBP.  Repeated sideglide abolishes sxs.  Suspect lateral derangement of lumbar spine.  Hip exam is WNL.  Is mildly tender to palpation at illiac crest about 2 inches behind the ASIS.       ASSESSMENT/PLAN  Updated problem list and treatment plan: Diagnosis 1:   Scalene/SCM strain with elevated 1st rib    Diagnosis 2:  Suspected lumbar lateral derangement with radiculopathy     STG/LTGs have been met or progress has been made towards goals:  Yes (See Goal flow sheet completed today.)    Assessment of Progress: The patient's condition is improving.  Self Management Plans:  Patient has been instructed in a home treatment program.    Aria continues to require the following intervention to meet STG and LTG's:  PT intervention is no longer required to meet STG/LTG.    Recommendations:  This patient is ready to be discharged from therapy and continue their home treatment program.    Thank you for your referral.    INQUIRIES  Therapist: Martinez Ba, PT  [facility] [place]  [address] [facility] [place] [address]  Phone: [phone]  Fax: [phone]

## 2019-08-16 NOTE — PROGRESS NOTES
Subjective:  HPI                    Objective:  System    Physical Exam    General     ROS    Assessment/Plan:    PROGRESS  REPORT    Progress reporting period is from 5/24/19 to 8/16/19 (9 visits).       SUBJECTIVE  Subjective changes noted by patient:    Subjective: Pt reports that she had no improvement in her right hip pain following an injection given in July, but she had no hip pain for about 2 weeks while on vacation in the [facility].  Her pain returned the day after returning home from the vacation.  Felt in the morning and with sitting.  Locates the pain in the area of the illiac crest about 2 inches behind the ASIS.  Is also tender to touch in that area.  She states that she has had little to no right neck/shoulder pain for the last 2-3 weeks.  She can lift her arms above her head and carry things with the right arm without pain now.      Current pain level is NA  .     Previous pain level was  Initial Pain level: 8/10.   Changes in function:  Yes (See Goal flowsheet attached for changes in current functional level)  Adverse reaction to treatment or activity: None    OBJECTIVE  Changes noted in objective findings:    Objective: Shoulder and neck ROM are full and painfree.  MMT of neck and shoulder are normal and painfree.  Repeated movements exam of the low back: Flexion is full and no sxs, repeated ext produces right LBP.  Repeated sideglide abolishes sxs.  Suspect lateral derangement of lumbar spine.  Hip exam is WNL.  Is mildly tender to palpation at illiac crest about 2 inches behind the ASIS.       ASSESSMENT/PLAN  Updated problem list and treatment plan: Diagnosis 1:  Scalene/SCM strain with elevated 1st rib    Diagnosis 2:  Suspected lumbar lateral derangement with radiculopathy     STG/LTGs have been met or progress has been made towards goals:  Yes (See Goal flow sheet completed today.)  Assessment of Progress: The patient's condition is improving.  Self Management Plans:  Patient has been  instructed in a home treatment program.    Aria continues to require the following intervention to meet STG and LTG's:  PT intervention is no longer required to meet STG/LTG.    Recommendations:  This patient is ready to be discharged from therapy and continue their home treatment program.    Please refer to the daily flowsheet for treatment today, total treatment time and time spent performing 1:1 timed codes.

## 2019-09-04 ENCOUNTER — THERAPY VISIT (OUTPATIENT)
Dept: PHYSICAL THERAPY | Facility: CLINIC | Age: 44
End: 2019-09-04
Payer: COMMERCIAL

## 2019-09-04 DIAGNOSIS — M54.16 LUMBAR RADICULOPATHY: ICD-10-CM

## 2019-09-04 DIAGNOSIS — S16.1XXD STRAIN OF NECK MUSCLE, SUBSEQUENT ENCOUNTER: ICD-10-CM

## 2019-09-04 PROCEDURE — 97110 THERAPEUTIC EXERCISES: CPT | Mod: GP | Performed by: PHYSICAL THERAPIST

## 2019-09-04 PROCEDURE — 97140 MANUAL THERAPY 1/> REGIONS: CPT | Mod: GP | Performed by: PHYSICAL THERAPIST

## 2019-09-11 ENCOUNTER — THERAPY VISIT (OUTPATIENT)
Dept: PHYSICAL THERAPY | Facility: CLINIC | Age: 44
End: 2019-09-11
Payer: COMMERCIAL

## 2019-09-11 DIAGNOSIS — M54.16 LUMBAR RADICULOPATHY: ICD-10-CM

## 2019-09-11 DIAGNOSIS — S16.1XXD STRAIN OF NECK MUSCLE, SUBSEQUENT ENCOUNTER: ICD-10-CM

## 2019-09-11 PROCEDURE — 97035 APP MDLTY 1+ULTRASOUND EA 15: CPT | Mod: GP | Performed by: PHYSICAL THERAPIST

## 2019-09-11 PROCEDURE — 97140 MANUAL THERAPY 1/> REGIONS: CPT | Mod: GP | Performed by: PHYSICAL THERAPIST

## 2019-09-20 PROBLEM — M54.16 LUMBAR RADICULOPATHY: Status: RESOLVED | Noted: 2019-05-24 | Resolved: 2019-09-20

## 2019-09-20 PROBLEM — S16.1XXA CERVICAL STRAIN: Status: RESOLVED | Noted: 2019-05-24 | Resolved: 2019-09-20

## 2019-09-24 ENCOUNTER — THERAPY VISIT (OUTPATIENT)
Dept: PHYSICAL THERAPY | Facility: CLINIC | Age: 44
End: 2019-09-24
Payer: COMMERCIAL

## 2019-09-24 DIAGNOSIS — M54.16 LUMBAR RADICULOPATHY: Primary | ICD-10-CM

## 2019-09-24 DIAGNOSIS — S16.1XXD STRAIN OF NECK MUSCLE, SUBSEQUENT ENCOUNTER: ICD-10-CM

## 2019-09-24 PROCEDURE — 97035 APP MDLTY 1+ULTRASOUND EA 15: CPT | Mod: GP | Performed by: PHYSICAL THERAPIST

## 2019-09-24 PROCEDURE — 97140 MANUAL THERAPY 1/> REGIONS: CPT | Mod: GP | Performed by: PHYSICAL THERAPIST

## 2019-09-24 PROCEDURE — 97110 THERAPEUTIC EXERCISES: CPT | Mod: GP | Performed by: PHYSICAL THERAPIST

## 2019-10-01 ENCOUNTER — HEALTH MAINTENANCE LETTER (OUTPATIENT)
Age: 44
End: 2019-10-01

## 2019-10-02 ENCOUNTER — THERAPY VISIT (OUTPATIENT)
Dept: PHYSICAL THERAPY | Facility: CLINIC | Age: 44
End: 2019-10-02
Payer: COMMERCIAL

## 2019-10-02 DIAGNOSIS — M54.16 LUMBAR RADICULOPATHY: Primary | ICD-10-CM

## 2019-10-02 PROCEDURE — 97035 APP MDLTY 1+ULTRASOUND EA 15: CPT | Mod: GP | Performed by: PHYSICAL THERAPIST

## 2019-10-02 PROCEDURE — 97140 MANUAL THERAPY 1/> REGIONS: CPT | Mod: GP | Performed by: PHYSICAL THERAPIST

## 2019-10-07 ENCOUNTER — THERAPY VISIT (OUTPATIENT)
Dept: PHYSICAL THERAPY | Facility: CLINIC | Age: 44
End: 2019-10-07
Payer: COMMERCIAL

## 2019-10-07 DIAGNOSIS — M54.16 LUMBAR RADICULOPATHY: Primary | ICD-10-CM

## 2019-10-07 PROCEDURE — 97110 THERAPEUTIC EXERCISES: CPT | Mod: GP

## 2019-10-07 PROCEDURE — 97140 MANUAL THERAPY 1/> REGIONS: CPT | Mod: GP

## 2019-10-07 PROCEDURE — 97035 APP MDLTY 1+ULTRASOUND EA 15: CPT | Mod: GP

## 2019-10-11 ENCOUNTER — THERAPY VISIT (OUTPATIENT)
Dept: PHYSICAL THERAPY | Facility: CLINIC | Age: 44
End: 2019-10-11
Payer: COMMERCIAL

## 2019-10-11 DIAGNOSIS — M54.16 LUMBAR RADICULOPATHY: Primary | ICD-10-CM

## 2019-10-11 PROCEDURE — 97140 MANUAL THERAPY 1/> REGIONS: CPT | Mod: GP

## 2019-10-11 PROCEDURE — 97110 THERAPEUTIC EXERCISES: CPT | Mod: GP

## 2019-10-11 PROCEDURE — 97035 APP MDLTY 1+ULTRASOUND EA 15: CPT | Mod: GP

## 2019-10-14 ENCOUNTER — THERAPY VISIT (OUTPATIENT)
Dept: PHYSICAL THERAPY | Facility: CLINIC | Age: 44
End: 2019-10-14
Payer: COMMERCIAL

## 2019-10-14 DIAGNOSIS — M54.16 LUMBAR RADICULOPATHY: Primary | ICD-10-CM

## 2019-10-14 PROCEDURE — 97140 MANUAL THERAPY 1/> REGIONS: CPT | Mod: GP

## 2019-10-14 PROCEDURE — 97110 THERAPEUTIC EXERCISES: CPT | Mod: GP

## 2019-10-14 PROCEDURE — 97035 APP MDLTY 1+ULTRASOUND EA 15: CPT | Mod: GP

## 2019-10-16 ENCOUNTER — THERAPY VISIT (OUTPATIENT)
Dept: PHYSICAL THERAPY | Facility: CLINIC | Age: 44
End: 2019-10-16
Payer: COMMERCIAL

## 2019-10-16 DIAGNOSIS — M25.551 HIP PAIN, RIGHT: ICD-10-CM

## 2019-10-16 PROCEDURE — 97110 THERAPEUTIC EXERCISES: CPT | Mod: GP | Performed by: PHYSICAL THERAPIST

## 2019-10-16 PROCEDURE — 97035 APP MDLTY 1+ULTRASOUND EA 15: CPT | Mod: GP | Performed by: PHYSICAL THERAPIST

## 2019-10-16 PROCEDURE — 97140 MANUAL THERAPY 1/> REGIONS: CPT | Mod: GP | Performed by: PHYSICAL THERAPIST

## 2019-10-22 ENCOUNTER — THERAPY VISIT (OUTPATIENT)
Dept: PHYSICAL THERAPY | Facility: CLINIC | Age: 44
End: 2019-10-22
Payer: COMMERCIAL

## 2019-10-22 DIAGNOSIS — M25.551 HIP PAIN, RIGHT: ICD-10-CM

## 2019-10-22 PROCEDURE — 97140 MANUAL THERAPY 1/> REGIONS: CPT | Mod: GP | Performed by: PHYSICAL THERAPIST

## 2019-10-22 PROCEDURE — 97035 APP MDLTY 1+ULTRASOUND EA 15: CPT | Mod: GP | Performed by: PHYSICAL THERAPIST

## 2019-10-25 ENCOUNTER — THERAPY VISIT (OUTPATIENT)
Dept: PHYSICAL THERAPY | Facility: CLINIC | Age: 44
End: 2019-10-25
Payer: COMMERCIAL

## 2019-10-25 DIAGNOSIS — M25.551 HIP PAIN, RIGHT: ICD-10-CM

## 2019-10-25 PROCEDURE — 97035 APP MDLTY 1+ULTRASOUND EA 15: CPT | Mod: GP

## 2019-10-25 PROCEDURE — 97140 MANUAL THERAPY 1/> REGIONS: CPT | Mod: GP

## 2019-11-04 ENCOUNTER — THERAPY VISIT (OUTPATIENT)
Dept: PHYSICAL THERAPY | Facility: CLINIC | Age: 44
End: 2019-11-04
Payer: COMMERCIAL

## 2019-11-04 DIAGNOSIS — M25.551 HIP PAIN, RIGHT: ICD-10-CM

## 2019-11-04 PROCEDURE — 97164 PT RE-EVAL EST PLAN CARE: CPT | Mod: GP | Performed by: PHYSICAL THERAPIST

## 2019-11-04 PROCEDURE — 97110 THERAPEUTIC EXERCISES: CPT | Mod: GP | Performed by: PHYSICAL THERAPIST

## 2019-11-04 NOTE — LETTER
[facility] ATHLETIC [facility]  [address]  [facility] [address]  [phone]    2019    Re: Aria Cuevas   :   1975  MRN:  [id number]   REFERRING PHYSICIAN:   Sukumar Varghese    [facility]  Date of Initial Evaluation:  10/02/19  Visits:  Rxs Used: 8  Reason for Referral:  Hip pain, right    PROGRESS  REPORT  Progress reporting period is from 10/2/19 to 19 (8 visits).     SUBJECTIVE  Subjective changes noted by patient:  Pt reports that her right hip pain and right LBP have gotten worse over the course of the last 1-2 months.  She describes a constant pain in the right anterior illiac crest area (1-4/10 PL) with intermittent right LBP.  Worse at night and with sitting.  She had an injection on 10/29/19 and reports no improvement in sxs to this point.       Current pain level is 2/10  .     Previous pain level was  NA  .   Changes in function:  None  Adverse reaction to treatment or activity: None  OBJECTIVE  Changes noted in objective findings:  Mild pain to palpation of the medial border of her right illiac crest.  Testing of her lumbar spine: Extension in standing and lying produce right LBP, but no impact on the right hip pain with a repeated movements exam. Normal hip testing.  Normal SI testing.    ASSESSMENT/PLAN  Updated problem list and treatment plan: Diagnosis 1:  Unknown etiology of the right hip pain    STG/LTGs have been met or progress has been made towards goals:  None  Assessment of Progress: The patient's condition is unchanged or worse.  Self Management Plans:  Patient has been instructed in a home treatment program.  Aria continues to require the following intervention to meet STG and LTG's:  PT intervention is no longer required to meet STG/LTG.  Recommendations:  This patient would benefit from further evaluation.  I have advised Dang to seek further medical attention as she has not improved and her hip pain is out  of the ordinary.      Thank you for your referral.  INQUIRIES  Therapist: Martinez Ba PT  [place] [facility] [place]  [address] [facility] [address]  Phone: [phone]  Fax: [phone]

## 2019-11-04 NOTE — LETTER
[facility] ATHLETIC [facility]  [address]  [facility] [address]  [phone]    2019    Re: Aria Cuevas   :   1975  MRN:  [id number]   REFERRING PHYSICIAN:   Rosalio Walters    [facility] ATHLET[facility]  Date of Initial Evaluation:  10/2/2019  Visits:  Rxs Used: 8  Reason for Referral:  Hip pain, right    PROGRESS  REPORT  Progress reporting period is from 10/2/19 to 19 (8 visits).       SUBJECTIVE  Subjective changes noted by patient:  Pt reports that her right hip pain and right LBP have gotten worse over the course of the last 1-2 months.  She describes a constant pain in the right anterior illiac crest area (1-4/10 PL) with intermittent right LBP.  Worse at night and with sitting.  She had an injection on 10/29/19 and reports no improvement in sxs to this point.       Current pain level is 2/10  .     Previous pain level was  NA  .   Changes in function:  None  Adverse reaction to treatment or activity: None    OBJECTIVE  Changes noted in objective findings:  Mild pain to palpation of the medial border of her right illiac crest.  Testing of her lumbar spine: Extension in standing and lying produce right LBP, but no impact on the right hip pain with a repeated movements exam. Normal hip testing.  Normal SI testing.      ASSESSMENT/PLAN  Updated problem list and treatment plan: Diagnosis 1:  Unknown etiology of the right hip pain    STG/LTGs have been met or progress has been made towards goals:  None  Assessment of Progress: The patient's condition is unchanged or worse.  Self Management Plans:  Patient has been instructed in a home treatment program.  Aria continues to require the following intervention to meet STG and LTG's:  PT intervention is no longer required to meet STG/LTG.    Recommendations:  This patient would benefit from further evaluation.  I have advised Dang to seek further medical attention as she has not improved and her hip  pain is out of the ordinary.            Re: Aria Cuevas   :   1975                    Thank you for your referral.    INQUIRIES  Therapist: Martinez Ba [facility] [place]  [address]  [facility] [address]  Phone: [phone]  Fax: [phone]

## 2019-11-05 NOTE — PROGRESS NOTES
Subjective:  HPI                    Objective:  System    Physical Exam    General     ROS    Assessment/Plan:    PROGRESS  REPORT    Progress reporting period is from 10/2/19 to 11/5/19 (8 visits).       SUBJECTIVE  Subjective changes noted by patient:  Pt reports that her right hip pain and right LBP have gotten worse over the course of the last 1-2 months.  She describes a constant pain in the right anterior illiac crest area (1-4/10 PL) with intermittent right LBP.  Worse at night and with sitting.  She had an injection on 10/29/19 and reports no improvement in sxs to this point.       Current pain level is 2/10  .     Previous pain level was  NA  .   Changes in function:  None  Adverse reaction to treatment or activity: None    OBJECTIVE  Changes noted in objective findings:  Mild pain to palpation of the medial border of her right illiac crest.  Testing of her lumbar spine: Extension in standing and lying produce right LBP, but no impact on the right hip pain with a repeated movements exam. Normal hip testing.  Normal SI testing.          ASSESSMENT/PLAN  Updated problem list and treatment plan: Diagnosis 1:  Unknown etiology of the right hip pain    STG/LTGs have been met or progress has been made towards goals:  None  Assessment of Progress: The patient's condition is unchanged or worse.  Self Management Plans:  Patient has been instructed in a home treatment program.    Aria continues to require the following intervention to meet STG and LTG's:  PT intervention is no longer required to meet STG/LTG.    Recommendations:  This patient would benefit from further evaluation.  I have advised Dang to seek further medical attention as she has not improved and her hip pain is out of the ordinary.    Please refer to the daily flowsheet for treatment today, total treatment time and time spent performing 1:1 timed codes.

## 2019-11-12 ENCOUNTER — OFFICE VISIT (OUTPATIENT)
Dept: FAMILY MEDICINE | Facility: CLINIC | Age: 44
End: 2019-11-12
Payer: COMMERCIAL

## 2019-11-12 VITALS
WEIGHT: 108 LBS | SYSTOLIC BLOOD PRESSURE: 100 MMHG | DIASTOLIC BLOOD PRESSURE: 74 MMHG | HEIGHT: 63 IN | OXYGEN SATURATION: 98 % | BODY MASS INDEX: 19.14 KG/M2 | TEMPERATURE: 98.6 F | HEART RATE: 74 BPM

## 2019-11-12 DIAGNOSIS — M25.551 ILIAC FOSSA PAIN, RIGHT: Primary | ICD-10-CM

## 2019-11-12 PROCEDURE — 99214 OFFICE O/P EST MOD 30 MIN: CPT | Performed by: FAMILY MEDICINE

## 2019-11-12 ASSESSMENT — ENCOUNTER SYMPTOMS
WEAKNESS: 0
BACK PAIN: 1
NECK PAIN: 1
FEVER: 0
UNEXPECTED WEIGHT CHANGE: 0
ARTHRALGIAS: 1
NUMBNESS: 0

## 2019-11-12 ASSESSMENT — MIFFLIN-ST. JEOR: SCORE: 1106.07
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
## 2019-11-13 ENCOUNTER — OFFICE VISIT (OUTPATIENT)
Dept: ORTHOPEDICS | Facility: CLINIC | Age: 44
End: 2019-11-13
Payer: COMMERCIAL

## 2019-11-13 ENCOUNTER — TELEPHONE (OUTPATIENT)
Dept: ORTHOPEDICS | Facility: CLINIC | Age: 44
End: 2019-11-13

## 2019-11-13 VITALS
HEIGHT: 62 IN | BODY MASS INDEX: 19.88 KG/M2 | WEIGHT: 108 LBS | DIASTOLIC BLOOD PRESSURE: 62 MMHG | SYSTOLIC BLOOD PRESSURE: 130 MMHG

## 2019-11-13 DIAGNOSIS — M25.551 RIGHT HIP PAIN: Primary | ICD-10-CM

## 2019-11-13 PROCEDURE — 99205 OFFICE O/P NEW HI 60 MIN: CPT | Performed by: FAMILY MEDICINE

## 2019-11-13 RX ORDER — METHYLPREDNISOLONE 4 MG
TABLET, DOSE PACK ORAL
Qty: 21 TABLET | Refills: 0 | Status: SHIPPED | OUTPATIENT
Start: 2019-11-13 | End: 2019-12-20

## 2019-11-13 ASSESSMENT — MIFFLIN-ST. JEOR: SCORE: 1102.1

## 2019-11-13 NOTE — TELEPHONE ENCOUNTER
Patient LVM that she didn't specify who she wanted to see so she was unsure how she ended up on a surgeons schedule. Will call and cancel appt and get scheduled with an ortho doc.     Patient rescheduled with Dr. Yeo today at 5:40    Nuvia Singh ATC
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
## 2019-11-13 NOTE — TELEPHONE ENCOUNTER
Left voicemail for patient to return call to Triage regarding her upcoming appointment.   Patient would benefit from ruling out low back pain prior to visit with Dr. Kunz due to history of low back pain.  Next available appointments with Sports Medicine provider is Monday 11/18/19.      Provided call back number for Triage to discuss further.     Lety Bourgeois, ATC
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
## 2019-11-14 ENCOUNTER — TELEPHONE (OUTPATIENT)
Dept: ORTHOPEDICS | Facility: CLINIC | Age: 44
End: 2019-11-14

## 2019-11-14 DIAGNOSIS — M25.551 RIGHT HIP PAIN: Primary | ICD-10-CM

## 2019-11-14 NOTE — TELEPHONE ENCOUNTER
I called patient to let her know I spoke with Martinez her therapist. patient will continue with her pelvic and core stabilizing exercises.  Patient will follow-up in 4 weeks for reevaluation after her oral medications and continued exercises.  If she continues to have pain, will consider lumbar versus pelvic imaging.  We will also consider possible referral to pain management in the future for either peripheral nerve blocks or epidural steroid injections.  To consider possible nerve medications if appropriate.

## 2019-11-14 NOTE — PATIENT INSTRUCTIONS
1. Right hip pain      -Patient has right hip pain likely due to muscle and tendon inflammation over the iliac crest  -I reviewed MRI of the right hip performed at [facility] on 5/13/2019  -Reviewed previous treatments with Dr. Walters from [facility] and neurology  -Patient received a cortisone injection over the right iliac crest which provided approximately 2 weeks of pain relief.  Afterwards patient was referred to neurology who diagnosed her with meralgia paresthetica and performed a peripheral nerve block.  Patient reports no improvement in pain in the lateral thigh nerve irritation.  -It is unclear what is the cause of the patient's pain and inflammation.  Patient's history does not fit for typical muscle strain and or tendinitis.  Patient may have a peripheral nerve entrapment possibly of the iliohypogastric nerve versus the ilioinguinal nerve versus the subcostal nerve.  -Patient will start a oral steroid taper, once complete will continue with nabumetone 750 mg twice a day as needed for 2 to 3 weeks until pain improves  -To consider possible trial of gabapentin to see if that alleviates her pain  -I will talk with her physical therapist to review all of her exercises and her program will determine a home exercise program to continue  -If she is not improving, to consider a referral to pain management for further work-up and treatment of a peripheral nerve entrapment  -Call direct clinic number [[phone]] at any time with questions or concerns.    Albert Yeo MD [facility] Orthopedics and Sports Medicine  [facility] [place]

## 2019-12-04 ENCOUNTER — OFFICE VISIT (OUTPATIENT)
Dept: ORTHOPEDICS | Facility: CLINIC | Age: 44
End: 2019-12-04
Payer: COMMERCIAL

## 2019-12-04 VITALS — DIASTOLIC BLOOD PRESSURE: 60 MMHG | SYSTOLIC BLOOD PRESSURE: 120 MMHG

## 2019-12-04 DIAGNOSIS — M54.16 LUMBAR RADICULOPATHY, CHRONIC: ICD-10-CM

## 2019-12-04 DIAGNOSIS — M25.551 RIGHT HIP PAIN: Primary | ICD-10-CM

## 2019-12-04 PROCEDURE — 99214 OFFICE O/P EST MOD 30 MIN: CPT | Performed by: FAMILY MEDICINE

## 2019-12-04 ASSESSMENT — MIFFLIN-ST. JEOR: SCORE: 1102

## 2019-12-04 NOTE — PATIENT INSTRUCTIONS
1. Right hip pain    2. Lumbar radiculopathy, chronic      -She is here for follow-up of right SI joint and iliac crest pain  -Patient reports no improvement with oral steroids, NSAIDs and continued home exercises.  -Patient will get an MRI of the lumbar spine and pelvis to evaluate for possible herniated disks causing nerve impingement versus possible tumors or bony pathology of the iliac crest causing persistent tenderness and pain.  -Your MRI has been ordered. Will check for same day otherwise, schedule with [place] ([phone]). Once you know the date of your MRI, please call my office and schedule a follow-up visit for 2 days after that.  -Call direct clinic number [[phone]] at any time with questions or concerns.    Albert Yeo MD CAM  [place] [facility]  [facility] [place]

## 2019-12-04 NOTE — PROGRESS NOTES
"ASSESSMENT & PLAN  Patient Instructions     1. Right hip pain    2. Lumbar radiculopathy, chronic      -She is here for follow-up of right SI joint and iliac crest pain  -Patient reports no improvement with oral steroids, NSAIDs and continued home exercises.  -Patient will get an MRI of the lumbar spine and pelvis to evaluate for possible herniated disks causing nerve impingement versus possible tumors or bony pathology of the iliac crest causing persistent tenderness and pain.  -Your MRI has been ordered. Will check for same day otherwise, schedule with [place] ([phone]). Once you know the date of your MRI, please call my office and schedule a follow-up visit for 2 days after that.  -Call direct clinic number [[phone]] at any time with questions or concerns.    Albert Yeo MD [facility] Orthopedics and Sports Medicine  [facility] [place]          -----    SUBJECTIVE:  Aria Cuevas is a 44 year old female who is seen in follow-up for right hip pain .They were last seen 11/14/2019.     Since their last visit reports 0% - (About the same as last time). They indicate that their current pain level is 2/10. They have tried other medications: Prednisone (Medrol) and Nabumetone. Doesn't feel like pain meds have helped at all. But has taken the meds every day as prescribed. Has also done home exercises every day as well and has not noticed any change in the pain.      The patient is seen by themselves.    Patient's past medical, surgical, social, and family histories were reviewed today and no changes are noted.    REVIEW OF SYSTEMS:  Constitutional: NEGATIVE for fever, chills, change in weight  Skin: NEGATIVE for worrisome rashes, moles or lesions  GI/: NEGATIVE for bowel or bladder changes  Neuro: NEGATIVE for weakness, dizziness or paresthesias    OBJECTIVE:  /60   Ht (P) 1.581 m (5' 2.24\")   Wt (P) 49 kg (108 lb)   BMI (P) 19.60 kg/m     General: healthy, alert and in no " distress  HEENT: no scleral icterus or conjunctival erythema  Skin: no suspicious lesions or rash. No jaundice.  CV: regular rhythm by palpation, no pedal edema  Resp: normal respiratory effort without conversational dyspnea   Psych: normal mood and affect  Gait: normal steady gait with appropriate coordination and balance  Neuro: normal light touch sensory exam of the extremities.    MSK:  RIGHT HIP  Inspection:    No obvious deformity or asymmetry, level pelvis  Palpation:    Tender about the iliac crest. Otherwise all other landmarks are nontender.  Active Range of Motion:     Flexion full, IR full, ER  full  Strength:    Flexion 5/5, adduction 5/5, abduction 5/5  Special Tests:    Positive: none    Negative: Logroll, resisted gluteus medius provocation, JOCELYNN, anterior impingement (FADIR), posterior impingement (EX/AB/ER), SI provocative testing    THORACIC/LUMBAR SPINE  Inspection:    No redness, swelling, overlying skin change, gross deformity/asymmetry, scapular winging  Palpation:    Tender about the right SI joint. Otherwise remainder of landmarks are nontender.  Range of Motion:     Lumbar flexion within normal limits    Lumbar extension within normal limits    Right side bend within normal limits    Left side bend within normal limits    Right rotation within normal limits    Left rotation within normal limits  Strength:    Full strength throughout hips/quads/hamstrings  Special Tests:    Positive: none  Negative: straight leg raise (bilateral), slump test (bilateral), stork test (bilateral), SI joint compression (right)      Patient's conditions were thoroughly discussed during today's visit with greater than 50% of the visit spent counseling the patient with total time spent face-to-face with the patient being 25 minutes.    Albert Yeo MD, [facility] Sports and Orthopedic Care

## 2019-12-04 NOTE — LETTER
12/4/2019         RE: Aria Cuevas  [id number] [id number]        Dear Colleague,    Thank you for referring your patient, Aria Cuevas, to the [facility] SP[facility]TS [facility]. Please see a copy of my visit note below.    ASSESSMENT & PLAN  Patient Instructions     1. Right hip pain    2. Lumbar radiculopathy, chronic      -She is here for follow-up of right SI joint and iliac crest pain  -Patient reports no improvement with oral steroids, NSAIDs and continued home exercises.  -Patient will get an MRI of the lumbar spine and pelvis to evaluate for possible herniated disks causing nerve impingement versus possible tumors or bony pathology of the iliac crest causing persistent tenderness and pain.  -Your MRI has been ordered. Will check for same day otherwise, schedule with [place] ([phone]). Once you know the date of your MRI, please call my office and schedule a follow-up visit for 2 days after that.  -Call direct clinic number [[phone]] at any time with questions or concerns.    Al[facility] Yeo MD [facility]  [facility] [place]          -----    SUBJECTIVE:  Aria Cuevas is a 44 year old female who is seen in follow-up for right hip pain .They were last seen 11/14/2019.     Since their last visit reports 0% - (About the same as last time). They indicate that their current pain level is 2/10. They have tried other medications: Prednisone (Medrol) and Nabumetone. Doesn't feel like pain meds have helped at all. But has taken the meds every day as prescribed. Has also done home exercises every day as well and has not noticed any change in the pain.      The patient is seen by themselves.    Patient's past medical, surgical, social, and family histories were reviewed today and no changes are noted.    REVIEW OF SYSTEMS:  Constitutional: NEGATIVE for fever, chills, change in weight  Skin: NEGATIVE for worrisome rashes,  "moles or lesions  GI/: NEGATIVE for bowel or bladder changes  Neuro: NEGATIVE for weakness, dizziness or paresthesias    OBJECTIVE:  /60   Ht (P) 1.581 m (5' 2.24\")   Wt (P) 49 kg (108 lb)   BMI (P) 19.60 kg/m      General: healthy, alert and in no distress  HEENT: no scleral icterus or conjunctival erythema  Skin: no suspicious lesions or rash. No jaundice.  CV: regular rhythm by palpation, no pedal edema  Resp: normal respiratory effort without conversational dyspnea   Psych: normal mood and affect  Gait: normal steady gait with appropriate coordination and balance  Neuro: normal light touch sensory exam of the extremities.    MSK:  RIGHT HIP  Inspection:    No obvious deformity or asymmetry, level pelvis  Palpation:    Tender about the iliac crest. Otherwise all other landmarks are nontender.  Active Range of Motion:     Flexion full, IR full, ER  full  Strength:    Flexion 5/5, adduction 5/5, abduction 5/5  Special Tests:    Positive: none    Negative: Logroll, resisted gluteus medius provocation, JOCELYNN, anterior impingement (FADIR), posterior impingement (EX/AB/ER), SI provocative testing    THORACIC/LUMBAR SPINE  Inspection:    No redness, swelling, overlying skin change, gross deformity/asymmetry, scapular winging  Palpation:    Tender about the right SI joint. Otherwise remainder of landmarks are nontender.  Range of Motion:     Lumbar flexion within normal limits    Lumbar extension within normal limits    Right side bend within normal limits    Left side bend within normal limits    Right rotation within normal limits    Left rotation within normal limits  Strength:    Full strength throughout hips/quads/hamstrings  Special Tests:    Positive: none  Negative: straight leg raise (bilateral), slump test (bilateral), stork test (bilateral), SI joint compression (right)      Patient's conditions were thoroughly discussed during today's visit with greater than 50% of the visit spent counseling the " patient with total time spent face-to-face with the patient being 25 minutes.    Albert Yeo MD, [facility] Sports and Orthopedic Care          Again, thank you for allowing me to participate in the care of your patient.        Sincerely,        Albert Yeo, MD

## 2019-12-05 ENCOUNTER — MYC MEDICAL ADVICE (OUTPATIENT)
Dept: ORTHOPEDICS | Facility: CLINIC | Age: 44
End: 2019-12-05

## 2019-12-09 ENCOUNTER — HOSPITAL ENCOUNTER (OUTPATIENT)
Dept: MRI IMAGING | Facility: CLINIC | Age: 44
End: 2019-12-09
Attending: FAMILY MEDICINE
Payer: COMMERCIAL

## 2019-12-09 ENCOUNTER — HOSPITAL ENCOUNTER (OUTPATIENT)
Dept: MRI IMAGING | Facility: CLINIC | Age: 44
Discharge: HOME | End: 2019-12-09
Attending: FAMILY MEDICINE | Admitting: FAMILY MEDICINE
Payer: COMMERCIAL

## 2019-12-09 DIAGNOSIS — M25.551 RIGHT HIP PAIN: ICD-10-CM

## 2019-12-09 DIAGNOSIS — M54.16 LUMBAR RADICULOPATHY, CHRONIC: ICD-10-CM

## 2019-12-09 PROCEDURE — 72195 MRI PELVIS W/O DYE: CPT

## 2019-12-09 PROCEDURE — 72148 MRI LUMBAR SPINE W/O DYE: CPT

## 2019-12-11 ENCOUNTER — OFFICE VISIT (OUTPATIENT)
Dept: ORTHOPEDICS | Facility: CLINIC | Age: 44
End: 2019-12-11
Payer: COMMERCIAL

## 2019-12-11 ENCOUNTER — TELEPHONE (OUTPATIENT)
Dept: PALLIATIVE MEDICINE | Facility: CLINIC | Age: 44
End: 2019-12-11

## 2019-12-11 VITALS
SYSTOLIC BLOOD PRESSURE: 100 MMHG | BODY MASS INDEX: 19.88 KG/M2 | DIASTOLIC BLOOD PRESSURE: 50 MMHG | HEIGHT: 62 IN | WEIGHT: 108 LBS

## 2019-12-11 DIAGNOSIS — M25.551 RIGHT HIP PAIN: Primary | ICD-10-CM

## 2019-12-11 PROCEDURE — 99214 OFFICE O/P EST MOD 30 MIN: CPT | Performed by: FAMILY MEDICINE

## 2019-12-11 ASSESSMENT — MIFFLIN-ST. JEOR: SCORE: 1101.94

## 2019-12-11 NOTE — TELEPHONE ENCOUNTER
DELBERT for pt to schedule new eval.    Anne-Marie SANCHEZ    [facility] Pain Management

## 2019-12-11 NOTE — PATIENT INSTRUCTIONS
1. Right hip pain      -Patient has chronic right hip pain due to an unknown cause.  -Patient had MRI of the pelvis and lumbar spine which were largely normal with just mild degenerative changes in the lower lumbar region that does not correspond to her current clinical symptoms.  -Patient referred to pain management for further evaluation and work-up.  Patient may have a peripheral nerve entrapment, likely of the ileo-inguinal versus iliohypogastric nerves.  To consider possible peripheral nerve block to assess response.  -Patient was also advised to get a second and third opinion if pain management is unable to determine the source of her pain.  She can also consider [facility] and or the [facility]  -Call direct clinic number [[phone]] at any time with questions or concerns.    Albert Yeo MD [facility] Orthopedics and Sports Medicine  [facility] [place]

## 2019-12-11 NOTE — LETTER
12/11/2019         RE: Aria Cuevas  [address] [facility] [id number]        Dear Colleague,    Thank you for referring your patient, Aria Cuevas, to the [facility]. Please see a copy of my visit note below.    ASSESSMENT & PLAN  Patient Instructions     1. Right hip pain      -Patient has chronic right hip pain due to an unknown cause.  -Patient had MRI of the pelvis and lumbar spine which were largely normal with just mild degenerative changes in the lower lumbar region that does not correspond to her current clinical symptoms.  -Patient referred to pain management for further evaluation and work-up.  Patient may have a peripheral nerve entrapment, likely of the ileo-inguinal versus iliohypogastric nerves.  To consider possible peripheral nerve block to assess response.  -Patient was also advised to get a second and third opinion if pain management is unable to determine the source of her pain.  She can also consider [facility] and or the [facility]  -Call direct clinic number [[phone]] at any time with questions or concerns.    Albert Yeo MD [facility]  [facility] [place]          -----    SUBJECTIVE:  Aria Cuevas is a 44 year old female who is seen in follow-up for right SI joint and iliac crest pain.They were last seen 12/4/2019.     Since their last visit reports 0% - (About the same as last time). They indicate that their current pain level is 3/10. They have tried rest/activity avoidance.      The patient is seen by themselves.    Patient's past medical, surgical, social, and family histories were reviewed today and no changes are noted.    REVIEW OF SYSTEMS:  Constitutional: NEGATIVE for fever, chills, change in weight  Skin: NEGATIVE for worrisome rashes, moles or lesions  GI/: NEGATIVE for bowel or bladder changes  Neuro: NEGATIVE for weakness, dizziness or  "paresthesias    OBJECTIVE:  /50   Ht 1.581 m (5' 2.24\")   Wt 49 kg (108 lb)   BMI 19.60 kg/m      General: healthy, alert and in no distress  HEENT: no scleral icterus or conjunctival erythema  Skin: no suspicious lesions or rash. No jaundice.  CV: regular rhythm by palpation, no pedal edema  Resp: normal respiratory effort without conversational dyspnea   Psych: normal mood and affect  Gait: normal steady gait with appropriate coordination and balance  Neuro: normal light touch sensory exam of the extremities.    MSK:  RIGHT HIP  Inspection:    No obvious deformity or asymmetry, level pelvis  Palpation:    Tender about the iliac crest. Otherwise all other landmarks are nontender.  Active Range of Motion:     Flexion full, IR full, ER  full  Strength:    Flexion 5/5, adduction 5/5, abduction 5/5  Special Tests:    Positive: none    Negative: Logroll, resisted gluteus medius provocation, JOCELYNN, anterior impingement (FADIR), posterior impingement (EX/AB/ER), SI provocative testing     THORACIC/LUMBAR SPINE  Inspection:    No redness, swelling, overlying skin change, gross deformity/asymmetry, scapular winging  Palpation:    Tender about the right SI joint. Otherwise remainder of landmarks are nontender.  Range of Motion:     Lumbar flexion within normal limits    Lumbar extension within normal limits    Right side bend within normal limits    Left side bend within normal limits    Right rotation within normal limits    Left rotation within normal limits  Strength:    Full strength throughout hips/quads/hamstrings  Special Tests:    Positive: none  Negative: straight leg raise (bilateral), slump test (bilateral), stork test (bilateral), SI joint compression (right)    Independent visualization of the below image:  Recent Results (from the past 744 hour(s))   MR Lumbar Spine w/o Contrast    Narrative    MRI OF THE LUMBAR SPINE WITHOUT CONTRAST December 9, 2019 7:50 AM     HISTORY: Right-sided SI joint and iliac " crest pain for many months. No  improvement with steroids, nonsteroidal anti-inflammatory drug.  Physical therapy. Rule out herniated nucleus pulposis. Lumbar  radiculopathy, chronic.    TECHNIQUE: Multiplanar, multisequence MRI images of the lumbar spine  were acquired without IV contrast.    COMPARISON: None.    FINDINGS: There are five lumbar-type vertebrae for the purposes of  this dictation.     There is normal alignment of the lumbar vertebrae. Vertebral body  heights of the lumbar spine are normal. Marrow signal throughout the  lumbar vertebrae is within normal limits. There is no evidence for  fracture or pathologic bony lesion of the lumbar spine.     There is minimal loss of disc height and disc desiccation at L3-L4,  L4-L5 and L5-S1 levels. The lumbar intervertebral discs are otherwise  within normal limits in height, contour and signal intensity. No  posterior disc bulges or herniations are noted at any level of the  lumbar spine.    The tip of the conus medullaris is at the mid L2 level which is within  normal limits. There is no evidence for intrathecal abnormality.     Level by level:     L1-L2: There is no spinal canal or neural foraminal stenosis at this  level.    L2-L3: There is no spinal canal or neural foraminal stenosis at this  level.    L3-L4: There is no spinal canal or neural foraminal stenosis at this  level.    L4-L5: There is mild facet arthropathy bilaterally. There is no spinal  canal or neural foraminal stenosis at this level.    L5-S1: There is mild facet arthropathy bilaterally. There is no spinal  canal or neural foraminal stenosis at this level.      Impression    IMPRESSION: Mild degenerative disc disease of the lower lumbar spine  as detailed above. No spinal canal or neural foraminal narrowing at  any level of the lumbar spine. No posterior disc herniations at any  level of the lumbar spine.    REMBERTO KENNY MD   MR Pelvis Bone wo Contrast    Narrative    MR PELVIC BONES  WITHOUT CONTRAST December 9, 2019 8:14 AM    HISTORY: Right hip pain for three to four years. No specific injury.    TECHNIQUE: Multiplanar, multisequence without contrast.    COMPARISON: None.    FINDINGS:   Osseous and Cartilaginous Structures: No fracture or destructive bone  lesion. No femoral head osteonecrosis. No significant hip  osteoarthritis or apparent chondromalacia.      Acetabular Labrum: There is linear increased signal at the base of the  superior labrum bilaterally, potentially representing bilateral  superior labral tears. If clinically indicated, this could be more  definitively evaluated with MR arthrogram.    Hip joint space: No significant joint effusion.     Trochanteric and Iliopsoas Bursae: No fluid collection in the  trochanteric or iliopsoas bursae.    Common Hamstring Tendon: No evidence of tear or significant  tendinosis.    Additional Findings: Muscles and other soft tissues around the hips  and pelvis appear normal.  The gluteus medius and minimus tendons  appear unremarkable.     Internal Pelvic Structures: No acute appearing abnormality. Trace  amount of free fluid.      Impression    IMPRESSION:   1. Cannot exclude superior labral tear on either side. If clinically  indicated, MRI arthrogram could be obtained.  2. Trace amount of free fluid in the pelvis.    TUCKER ZAPATA MD         Patient's conditions were thoroughly discussed during today's visit with greater than 50% of the visit spent counseling the patient with total time spent face-to-face with the patient being 25 minutes.    Albert Yeo MD, [facility]          Again, thank you for allowing me to participate in the care of your patient.        Sincerely,        Albert Yeo, MD

## 2019-12-11 NOTE — PROGRESS NOTES
"ASSESSMENT & PLAN  Patient Instructions     1. Right hip pain      -Patient has chronic right hip pain due to an unknown cause.  -Patient had MRI of the pelvis and lumbar spine which were largely normal with just mild degenerative changes in the lower lumbar region that does not correspond to her current clinical symptoms.  -Patient referred to pain management for further evaluation and work-up.  Patient may have a peripheral nerve entrapment, likely of the ileo-inguinal versus iliohypogastric nerves.  To consider possible peripheral nerve block to assess response.  -Patient was also advised to get a second and third opinion if pain management is unable to determine the source of her pain.  She can also consider [facility] and or the [facility]  -Call direct clinic number [[phone]] at any time with questions or concerns.    Albert Yeo MD [facility]  [facility]          -----    SUBJECTIVE:  Aria Cuevas is a 44 year old female who is seen in follow-up for right SI joint and iliac crest pain.They were last seen 12/4/2019.     Since their last visit reports 0% - (About the same as last time). They indicate that their current pain level is 3/10. They have tried rest/activity avoidance.      The patient is seen by themselves.    Patient's past medical, surgical, social, and family histories were reviewed today and no changes are noted.    REVIEW OF SYSTEMS:  Constitutional: NEGATIVE for fever, chills, change in weight  Skin: NEGATIVE for worrisome rashes, moles or lesions  GI/: NEGATIVE for bowel or bladder changes  Neuro: NEGATIVE for weakness, dizziness or paresthesias    OBJECTIVE:  /50   Ht 1.581 m (5' 2.24\")   Wt 49 kg (108 lb)   BMI 19.60 kg/m     General: healthy, alert and in no distress  HEENT: no scleral icterus or conjunctival erythema  Skin: no suspicious lesions or rash. No jaundice.  CV: regular rhythm by palpation, no " pedal edema  Resp: normal respiratory effort without conversational dyspnea   Psych: normal mood and affect  Gait: normal steady gait with appropriate coordination and balance  Neuro: normal light touch sensory exam of the extremities.    MSK:  RIGHT HIP  Inspection:    No obvious deformity or asymmetry, level pelvis  Palpation:    Tender about the iliac crest. Otherwise all other landmarks are nontender.  Active Range of Motion:     Flexion full, IR full, ER  full  Strength:    Flexion 5/5, adduction 5/5, abduction 5/5  Special Tests:    Positive: none    Negative: Logroll, resisted gluteus medius provocation, JOCELYNN, anterior impingement (FADIR), posterior impingement (EX/AB/ER), SI provocative testing     THORACIC/LUMBAR SPINE  Inspection:    No redness, swelling, overlying skin change, gross deformity/asymmetry, scapular winging  Palpation:    Tender about the right SI joint. Otherwise remainder of landmarks are nontender.  Range of Motion:     Lumbar flexion within normal limits    Lumbar extension within normal limits    Right side bend within normal limits    Left side bend within normal limits    Right rotation within normal limits    Left rotation within normal limits  Strength:    Full strength throughout hips/quads/hamstrings  Special Tests:    Positive: none  Negative: straight leg raise (bilateral), slump test (bilateral), stork test (bilateral), SI joint compression (right)    Independent visualization of the below image:  Recent Results (from the past 744 hour(s))   MR Lumbar Spine w/o Contrast    Narrative    MRI OF THE LUMBAR SPINE WITHOUT CONTRAST December 9, 2019 7:50 AM     HISTORY: Right-sided SI joint and iliac crest pain for many months. No  improvement with steroids, nonsteroidal anti-inflammatory drug.  Physical therapy. Rule out herniated nucleus pulposis. Lumbar  radiculopathy, chronic.    TECHNIQUE: Multiplanar, multisequence MRI images of the lumbar spine  were acquired without IV  contrast.    COMPARISON: None.    FINDINGS: There are five lumbar-type vertebrae for the purposes of  this dictation.     There is normal alignment of the lumbar vertebrae. Vertebral body  heights of the lumbar spine are normal. Marrow signal throughout the  lumbar vertebrae is within normal limits. There is no evidence for  fracture or pathologic bony lesion of the lumbar spine.     There is minimal loss of disc height and disc desiccation at L3-L4,  L4-L5 and L5-S1 levels. The lumbar intervertebral discs are otherwise  within normal limits in height, contour and signal intensity. No  posterior disc bulges or herniations are noted at any level of the  lumbar spine.    The tip of the conus medullaris is at the mid L2 level which is within  normal limits. There is no evidence for intrathecal abnormality.     Level by level:     L1-L2: There is no spinal canal or neural foraminal stenosis at this  level.    L2-L3: There is no spinal canal or neural foraminal stenosis at this  level.    L3-L4: There is no spinal canal or neural foraminal stenosis at this  level.    L4-L5: There is mild facet arthropathy bilaterally. There is no spinal  canal or neural foraminal stenosis at this level.    L5-S1: There is mild facet arthropathy bilaterally. There is no spinal  canal or neural foraminal stenosis at this level.      Impression    IMPRESSION: Mild degenerative disc disease of the lower lumbar spine  as detailed above. No spinal canal or neural foraminal narrowing at  any level of the lumbar spine. No posterior disc herniations at any  level of the lumbar spine.    REMBERTO KENNY MD   MR Pelvis Bone wo Contrast    Narrative    MR PELVIC BONES WITHOUT CONTRAST December 9, 2019 8:14 AM    HISTORY: Right hip pain for three to four years. No specific injury.    TECHNIQUE: Multiplanar, multisequence without contrast.    COMPARISON: None.    FINDINGS:   Osseous and Cartilaginous Structures: No fracture or destructive bone  lesion.  No femoral head osteonecrosis. No significant hip  osteoarthritis or apparent chondromalacia.      Acetabular Labrum: There is linear increased signal at the base of the  superior labrum bilaterally, potentially representing bilateral  superior labral tears. If clinically indicated, this could be more  definitively evaluated with MR arthrogram.    Hip joint space: No significant joint effusion.     Trochanteric and Iliopsoas Bursae: No fluid collection in the  trochanteric or iliopsoas bursae.    Common Hamstring Tendon: No evidence of tear or significant  tendinosis.    Additional Findings: Muscles and other soft tissues around the hips  and pelvis appear normal.  The gluteus medius and minimus tendons  appear unremarkable.     Internal Pelvic Structures: No acute appearing abnormality. Trace  amount of free fluid.      Impression    IMPRESSION:   1. Cannot exclude superior labral tear on either side. If clinically  indicated, MRI arthrogram could be obtained.  2. Trace amount of free fluid in the pelvis.    TUCKER ZAPATA MD         Patient's conditions were thoroughly discussed during today's visit with greater than 50% of the visit spent counseling the patient with total time spent face-to-face with the patient being 25 minutes.    Albert Yeo MD, [facility]

## 2019-12-12 NOTE — TELEPHONE ENCOUNTER
Pain Management Center Referral      1. Confirmed address with patient? Yes  2. Confirmed phone number with patient? Yes  3. Confirmed referring provider? Yes  4. Is the PCP the same as the referring provider? No  5. Has the patient been to any previous pain clinics? No  (If yes, send MAC with welcome letter)  6. Which insurance are we to bill for this appointment?  [facility]    7. Informed pt of cancellation (48 hour) policy? Yes    REGARDING OPIOID MEDICATIONS: We will always address appropriateness of opioid pain medications, but we generally will not automatically take on a prescribing role. When we do take on prescribing of opioids for chronic pain, it is in collaboration with the referring physician for an intermediate period of time (months), with an expectation that the primary physician or provider will assume the prescribing role if medications are effective at stable doses with demonstrated compliance. Therefore, please do not assume that your prescribing responsibilities end on the day of pain clinic consultation.  8. Informed pt of prescribing policy? Yes    9.Please be aware that once you are established with a pain provider and location, you will need to continue have all future visits with that provider and location. It is best to determine what location is the most convenient for you and schedule with that one.    ** PATIENT INFORMED OF THIS POLICY Yes      9. Referring Provider: Dr. Yeo Sarah O    [facility]

## 2019-12-19 NOTE — PROGRESS NOTES
[facility] Consultation    Date of visit: 12/19/2019    Reason for consultation:    Aria Cuevas is a 44 year old female who is seen in consultation today at the request of Dr. Albert Yeo    Consultation and Evaluation for: right hip pain    Please see the [facility] health questionnaire which the patient completed and reviewed with me in detail.    Review of [place] Prescription Monitoring Program (): No concern for abuse or misuse of controlled medications based on this report.     Pain medications are being prescribed by .     Aria Cuevas has not been seen at a pain clinic in the past.      Chief Complaint:    Chief Complaint   Patient presents with     Pain Management     right low back and illac     Pain history:  Aria Cuevas is a 44 year old female who presents for initial evaluation of chief pain complaint of right low back and iliac crest pain. Pain began insidiously about 4 years ago but has been worse over the past 4 months. Located mainly in the right iliac crest but also has some right sided low back pain. The iliac crest pain is located along the posterior crest and the lateral aspect of the crest. Pain is constant and nagging in quality. It can get up to 3/10 in severity at its worst and 1/10 at best. Aggravated by sitting with a slouched posture and laying on her sides. It does not radiate anywhere. No groin pain.      Back pain is located in low right lumbar region. Intermittent. Aching in quality. Aggravated with walking, standing, laying flat on back. No radicular symptoms into the leg. She has some relief with stretching and massage.     The patient otherwise denies bowel or bladder incontinence, parasthesias, weakness, saddle anesthesia, unintentional weight loss, or fever/chills/sweats.     She has done extensive PT without much improvement in symptoms. Also tried a right lateral femoral  cutaneous nerve block without benefit. The only thing that helped with the iliac crest pain and back pain was a localized injection over the lateral iliac crest. This helped for 2 weeks. She is unsure of further details regarding this injection.     Pain Treatments:  (H--helped; HI--Helped initially; [facility]--Somewhat helpful; [facility]--No help; W--worse; SE--side effects; ?--Unsure if helpful)   1. Medications:       Current pain medications:   None    Current calculated MME: 0     1. Previous Pain Relevant Medications:  NOTE: This medication information taken from patient's intake form, not medical records.    Opiates: na   NSAIDS: Nabumetone - [facility]    Muscle Relaxants: na   Anti-migraine mediations: na   Anti-depressants: na   Sleep aids:na   Anxiolytics: na   Neuropathics: na    Topicals: na   Other medications not covered above: Prednisone - [facility]    2. Physical Therapy: Yes - has done a lot of PT for hip and back pain - [facility]  3. Pain Psychology: None  4. Surgery: None  5. Injections:   - Had an injection at [facility] but unsure what it was - [facility] for 2 weeks  - right LFCN block - [facility]  6. Chiropractic: No  7. Acupuncture: No  8. TENS Unit: No  9. Other: heat - [facility], massage - temporary benefit    Diagnostic tests:  MRI of Lumbar Spine was completed on 12/9/19 was reviewed with the patient and shows:  FINDINGS: There are five lumbar-type vertebrae for the purposes of  this dictation.      There is normal alignment of the lumbar vertebrae. Vertebral body  heights of the lumbar spine are normal. Marrow signal throughout the  lumbar vertebrae is within normal limits. There is no evidence for  fracture or pathologic bony lesion of the lumbar spine.      There is minimal loss of disc height and disc desiccation at L3-L4,  L4-L5 and L5-S1 levels. The lumbar intervertebral discs are otherwise  within normal limits in height, contour and signal intensity. No  posterior disc bulges or herniations are noted at any level of the  lumbar spine.     The  tip of the conus medullaris is at the mid L2 level which is within  normal limits. There is no evidence for intrathecal abnormality.      Level by level:      L1-L2: There is no spinal canal or neural foraminal stenosis at this  level.     L2-L3: There is no spinal canal or neural foraminal stenosis at this  level.     L3-L4: There is no spinal canal or neural foraminal stenosis at this  level.     L4-L5: There is mild facet arthropathy bilaterally. There is no spinal  canal or neural foraminal stenosis at this level.     L5-S1: There is mild facet arthropathy bilaterally. There is no spinal  canal or neural foraminal stenosis at this level.                                                                      IMPRESSION: Mild degenerative disc disease of the lower lumbar spine  as detailed above. No spinal canal or neural foraminal narrowing at  any level of the lumbar spine. No posterior disc herniations at any  level of the lumbar spine.    MRI of Pelvis completed on 12/9/2019 showed:  FINDINGS:   Osseous and Cartilaginous Structures: No fracture or destructive bone  lesion. No femoral head osteonecrosis. No significant hip  osteoarthritis or apparent chondromalacia.       Acetabular Labrum: There is linear increased signal at the base of the  superior labrum bilaterally, potentially representing bilateral  superior labral tears. If clinically indicated, this could be more  definitively evaluated with MR arthrogram.     Hip joint space: No significant joint effusion.      Trochanteric and Iliopsoas Bursae: No fluid collection in the  trochanteric or iliopsoas bursae.     Common Hamstring Tendon: No evidence of tear or significant  tendinosis.     Additional Findings: Muscles and other soft tissues around the hips  and pelvis appear normal.  The gluteus medius and minimus tendons  appear unremarkable.      Internal Pelvic Structures: No acute appearing abnormality. Trace  amount of free fluid.                                                                       IMPRESSION:   1. Cannot exclude superior labral tear on either side. If clinically  indicated, MRI arthrogram could be obtained.  2. Trace amount of free fluid in the pelvis.    EMG/Testing:  NA    Labs:   No recent labs    Past Medical History:  Past Medical History:   Diagnosis Date     Iliac fossa pain, right        Past Surgical History:  Past Surgical History:   Procedure Laterality Date     NO HISTORY OF SURGERY         Medications:  Current Outpatient Medications   Medication Sig Dispense Refill     methylPREDNISolone (MEDROL DOSEPAK) 4 MG tablet therapy pack Follow Package Directions (Patient not taking: Reported on 12/4/2019) 21 tablet 0     nabumetone (RELAFEN) 750 MG tablet Take 1 tablet (750 mg) by mouth 2 times daily as needed for moderate pain (Patient not taking: Reported on 12/11/2019) 60 tablet 1       Allergies:     Allergies   Allergen Reactions     No Known Drug Allergies        Social History:  Home situation: Lives in [place], [place]. Has 3 kids.   Occupation/Schooling: Finishing master's of fine arts.   Tobacco use: none  Drug use: none  Alcohol use: occassional       Family history:  Family History   Problem Relation Age of Onset     Cancer Mother         skin     Family History Negative Other      Review of Systems:    POSTIVE IN BOLD  GENERAL: fever/chills, fatigue, general unwell feeling, weight gain/loss.  HEAD/EYES:  headache, dizziness, or vision changes.    EARS/NOSE/THROAT:  Nosebleeds, hearing loss, sinus infection, earache, tinnitus.  IMMUNE:  Allergies, cancer, immune deficiency, or infections.  SKIN:  Urticaria, rash, hives  HEME/Lymphatic:   anemia, easy bruising, easy bleeding.  RESPIRATORY:  cough, wheezing, or shortness of breath  CARDIOVASCULAR/Circulation:  Extremity edema, syncope, hypertension, tachycardia, or angina.  GASTROINTESTINAL:  abdominal pain, nausea/emesis, diarrhea, constipation,  hematochezia, or melena.  ENDOCRINE:  " Diabetes, steroid use,  thyroid disease or osteoporosis.  MUSCULOSKELETAL: neck pain, back pain, arthralgia, arthritis, or gout.  GENITOURINARY:  frequency, urgency, dysuria, difficulty voiding, hematuria or incontinence.  NEUROLOGIC:  weakness, numbness, paresthesias, seizure, tremor, stroke or memory loss.  PSYCHIATRIC:  depression, anxiety, stress, suicidal thoughts or mood swings.     Physical Exam:  Vitals:    12/20/19 1415   BP: 112/72   Pulse: 79   SpO2: 98%   Weight: 49.9 kg (110 lb)   Height: 1.581 m (5' 2.24\")     Exam:  Constitutional: Well developed, well nourished, appears stated age.  HEENT: Head atraumatic, normocephalic. Eyes without conjunctival injection or jaundice. Oropharynx clear. Neck supple. No obvious neck masses.  Respiratory: Breathing unlabored on room air.   Gastrointestinal: Abdomen soft, non-tender, without guarding/rebound.  Skin: No rash, lesions, or petechiae of exposed skin.   Extremities: No clubbing, cyanosis, or edema.  Psychiatric/mental status: Alert, without lethargy or stupor. Speech fluent. Appropriate affect. Mood normal. Able to follow commands without difficulty.     Musculoskeletal exam:  Gait/Station/Posture:   Normal stance, arm swing, and stride; no antalgia or Trendelenburg  Normal bulk and tone. Unremarkable spinal curvature. ASIS heights even.      Lumbar spine:  Range of motion within normal limits    Rotation/ext to right: pain free   Rotation/ext to left: pain free  Myofascial tenderness:  Tenderness to palpation of right quadratus lumborum.  Focal tenderness: No SI joint, gluteal, piriformis, GT, or IT tenderness  SLR: negative b/l  Jarvis's maneuver: negative b/l  Tender to palpation of posterior iliac crest and over lateral iliac crest    Hip exam:   normal internal and external range of motion bilaterally. JOCELYNN negative. Scour negative.     Neurologic exam:  CN:  Cranial nerves 2-12 are grossly intact  Motor Strength:  5/5 symmetric UE and LE " strength    Reflexes:     Patella L4:  R:  2/4 L: 2/4   Achilles S1:  R:  2/4 L: 2/4     No ankle clonus    Sensory:  (lower extremities):   Light touch: normal    Allodynia: absent    Hyperalgesia: absent      Assessment:  Aria Cuevas is a 44 year old female with no significant past medical history who presents with complaints of right sided low back and iliac crest pain.     1. Right iliac crest pain: Etiology of pain is unclear. MRI of lumbar spine and pelvis shows no pathology. Location of pain does not follow any particular peripheral nerve distribution suggestive of a peripheral nerve entrapment. She has done extensive PT without improvement in symptoms.    2. Right low back pain: Etiology most consistent with mechanical, myofascial pain. MRI of lumbar spine is normal. Exam is positive for tenderness over right quadratus lumborum.       Plan:  The following recommendations were given to the patient. Diagnosis, treatment options, risks, benefits, and alternatives were discussed, and all questions were answered. The patient expressed understanding of the plan for management.     1. Therapy: Continue with HEP learned in PT.  2. Clinical Health Pain Psychologist: Not at this time. Can consider in the future is coping with the pain is an issue. Therapy can help reduce physical and psychosocial triggers or reinforcers of pain by adapting thoughts, feelings and behaviors to reduce symptoms and increase quality of life.  Evidence indicates that the practice of relaxation, meditation, and mindfulness techniques can significantly affect pain levels and overall well being.  3. Diagnostic Studies: No additional imaging needed.  4. Medication Management:   1. Discussed trying OTC topicals including lidocaine 4% patches, arnica gel, Bengay, icy/hot...  2. Start cyclobenzaprine 5-10 mg q hs prn  5. Further procedures recommended: None  6. Order placed to try chiropractic treatment  7. Other: Discussed trying a TENS  unit and theracane to see if it provides benefit.   8. Follow up: after 2-3 chiropractic sessions    Review of Electronic Chart: Today I have also reviewed available medical information in the patient's medical record at [facility] ([facility] including relevant provider notes, laboratory work, and imaging.     I spent 60 minutes of time face to face with the patient.  Greater than 50% of this time was spent in patient counseling and/or coordination of care regarding principles of multidisciplinary care, medication management, and treatment options as discussed above.     June Alejo MD  [facility]

## 2019-12-20 ENCOUNTER — MEDICAL CORRESPONDENCE (OUTPATIENT)
Dept: HEALTH INFORMATION MANAGEMENT | Facility: CLINIC | Age: 44
End: 2019-12-20

## 2019-12-20 ENCOUNTER — TELEPHONE (OUTPATIENT)
Dept: ORTHOPEDICS | Facility: CLINIC | Age: 44
End: 2019-12-20

## 2019-12-20 ENCOUNTER — OFFICE VISIT (OUTPATIENT)
Dept: PALLIATIVE MEDICINE | Facility: CLINIC | Age: 44
End: 2019-12-20
Payer: COMMERCIAL

## 2019-12-20 VITALS
HEIGHT: 62 IN | BODY MASS INDEX: 20.24 KG/M2 | HEART RATE: 79 BPM | SYSTOLIC BLOOD PRESSURE: 112 MMHG | OXYGEN SATURATION: 98 % | DIASTOLIC BLOOD PRESSURE: 72 MMHG | WEIGHT: 110 LBS

## 2019-12-20 DIAGNOSIS — M25.551 ILIAC FOSSA PAIN, RIGHT: ICD-10-CM

## 2019-12-20 DIAGNOSIS — M79.18 MYOFASCIAL PAIN: Primary | ICD-10-CM

## 2019-12-20 PROCEDURE — 99207 ZZC CONSULT E&M CHANGED TO INITIAL LEVEL: CPT | Performed by: PHYSICAL MEDICINE & REHABILITATION

## 2019-12-20 PROCEDURE — 99204 OFFICE O/P NEW MOD 45 MIN: CPT | Performed by: PHYSICAL MEDICINE & REHABILITATION

## 2019-12-20 RX ORDER — CYCLOBENZAPRINE HCL 5 MG
5-10 TABLET ORAL
Qty: 30 TABLET | Refills: 1 | Status: SHIPPED | OUTPATIENT
Start: 2019-12-20 | End: 2023-12-04

## 2019-12-20 ASSESSMENT — MIFFLIN-ST. JEOR: SCORE: 1111.02

## 2019-12-20 NOTE — LETTER
[facility]  [id number]  [place] [address]  [phone]      December 20, 2019      RE: Aria Cuevas  [address] [place] [address]       Whom it may concern,     This is a letter of medical necessity for patient Aria Cuevas for an MRI of the pelvis to evaluate chronic right iliac crest pain.  Patient has had right pelvic pain for approximately 3 years.  Prior to seeing me, patient has seen an orthopedic surgeon who ordered an MRI of the right hip which was unremarkable.  Patient received a cortisone injection which provided only 2 weeks of relief.  Patient was referred to neurology to try a peripheral nerve block which did not improve her symptoms.  Patient has performed more than 6 months of physical therapy and home exercises with no improvement in her pain.  Patient reports daily pain with sitting, standing, various activities and regular nighttime pain.  Pain was not always associated with exertion or physical activity.  I was concerned for possible tumor or mass over the iliac crest which may be causing her persistent daytime and nighttime symptoms since she was not responding to any treatment modality thus far.  MRI of the pelvis is medically necessary since she is having ongoing symptoms and has failed all conservative treatment options to this point.          Sincerely,             Albert Yeo MD

## 2019-12-20 NOTE — TELEPHONE ENCOUNTER
Shari with Dr. Yeo. Faxed letter of necessity and office notes to insurance company at [phone].    Lacey Pham MS, ATC

## 2019-12-20 NOTE — LETTER
Whom it may concern,    This is a letter of medical necessity for patient Aria Cuevas for an MRI of the pelvis to evaluate chronic right iliac crest pain.  Patient has had right pelvic pain for approximately 3 years.  Prior to seeing me, patient has seen an orthopedic surgeon who ordered an MRI of the right hip which was unremarkable.  Patient received a cortisone injection which provided only 2 weeks of relief.  Patient was referred to neurology to try a peripheral nerve block which did not improve her symptoms.  Patient has performed more than 6 months of physical therapy and home exercises with no improvement in her pain.  Patient reports daily pain with sitting, standing, various activities and regular nighttime pain.  Pain was not always associated with exertion or physical activity.  I was concerned for possible tumor or mass over the iliac crest which may be causing her persistent daytime and nighttime symptoms since she was not responding to any treatment modality thus far.  MRI of the pelvis is medically necessary since she is having ongoing symptoms and has failed all conservative treatment options to this point.      Sincerely,        Albert Yeo MD    Primary care Sports Medicine physician  [facility]

## 2019-12-20 NOTE — PATIENT INSTRUCTIONS
Thank you for coming to [facility] for your care. It is my goal to partner with you to help you reach your optimal state of health.     You were seen today for your hip pain. As discussed during your visit these are the recommendations:    1. Medications: you can try over the counter topicals such as lidocaine 4% patches, icy/hot, Bengay, Arnica gel  2. Therapies: continue with your home exercise  3. Procedures: none at this time  4. Imaging/Diagnostic Studies: no new imaging needed  5. order placed to start chiropractic   6. You can try using a theracane and TENS unit to see if they provide some benefit  7. Follow up: I will see you after 2-3 chiropractic sessions    ----------------------------------------------------------------  Clinic Number:  [phone]     Call with any questions about your care and for scheduling assistance.     Calls are returned Monday through Friday between 8 AM and 4:30 PM. We usually get back to you within 2 business days depending on the issue/request.    If we are prescribing your medications:    For opioid medication refills, call the clinic or send a untapt message 7 days in advance.  Please include:    Name of requested medication    Name of the pharmacy.    For non-opioid medications, call your pharmacy directly to request a refill. Please allow 3-4 days to be processed.     Per [place] State Law:    All controlled substance prescriptions must be filled within 30 days of being written.      For those controlled substances allowing refills, pickup must occur within 30 days of last fill.      We believe regular attendance is key to your success in our program!      Any time you are unable to keep your appointment we ask that you call us at least 24 hours in advance to cancel.This will allow us to offer the appointment time to another patient.     Multiple missed appointments may lead to dismissal from the clinic.

## 2019-12-21 ENCOUNTER — MYC MEDICAL ADVICE (OUTPATIENT)
Dept: ORTHOPEDICS | Facility: CLINIC | Age: 44
End: 2019-12-21

## 2020-02-04 ENCOUNTER — THERAPY VISIT (OUTPATIENT)
Dept: CHIROPRACTIC MEDICINE | Facility: CLINIC | Age: 45
End: 2020-02-04
Attending: PHYSICAL MEDICINE & REHABILITATION
Payer: COMMERCIAL

## 2020-02-04 DIAGNOSIS — M99.03 SOMATIC DYSFUNCTION OF LUMBAR REGION: Primary | ICD-10-CM

## 2020-02-04 DIAGNOSIS — M25.551 PAIN OF RIGHT ILIAC FOSSA: ICD-10-CM

## 2020-02-04 DIAGNOSIS — M54.50 CHRONIC RIGHT-SIDED LOW BACK PAIN WITHOUT SCIATICA: ICD-10-CM

## 2020-02-04 DIAGNOSIS — G89.29 CHRONIC RIGHT-SIDED LOW BACK PAIN WITHOUT SCIATICA: ICD-10-CM

## 2020-02-04 DIAGNOSIS — M99.04 SOMATIC DYSFUNCTION OF SACRAL REGION: ICD-10-CM

## 2020-02-04 DIAGNOSIS — M99.05 SOMATIC DYSFUNCTION OF PELVIS REGION: ICD-10-CM

## 2020-02-04 PROCEDURE — 98941 CHIROPRACT MANJ 3-4 REGIONS: CPT | Mod: AT | Performed by: CHIROPRACTOR

## 2020-02-04 PROCEDURE — 97112 NEUROMUSCULAR REEDUCATION: CPT | Mod: 59 | Performed by: CHIROPRACTOR

## 2020-02-04 PROCEDURE — 99203 OFFICE O/P NEW LOW 30 MIN: CPT | Mod: 25 | Performed by: CHIROPRACTOR
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
## 2020-02-11 ENCOUNTER — THERAPY VISIT (OUTPATIENT)
Dept: CHIROPRACTIC MEDICINE | Facility: CLINIC | Age: 45
End: 2020-02-11
Attending: PHYSICAL MEDICINE & REHABILITATION
Payer: COMMERCIAL

## 2020-02-11 DIAGNOSIS — M25.551 HIP PAIN, RIGHT: ICD-10-CM

## 2020-02-11 DIAGNOSIS — M54.50 CHRONIC RIGHT-SIDED LOW BACK PAIN WITHOUT SCIATICA: ICD-10-CM

## 2020-02-11 DIAGNOSIS — G89.29 CHRONIC RIGHT-SIDED LOW BACK PAIN WITHOUT SCIATICA: ICD-10-CM

## 2020-02-11 DIAGNOSIS — M99.04 SOMATIC DYSFUNCTION OF SACRAL REGION: ICD-10-CM

## 2020-02-11 DIAGNOSIS — M99.03 SOMATIC DYSFUNCTION OF LUMBAR REGION: Primary | ICD-10-CM

## 2020-02-11 DIAGNOSIS — M99.05 SOMATIC DYSFUNCTION OF PELVIS REGION: ICD-10-CM

## 2020-02-11 PROCEDURE — 97810 ACUP 1/> WO ESTIM 1ST 15 MIN: CPT | Mod: GA | Performed by: CHIROPRACTOR

## 2020-02-11 PROCEDURE — 98941 CHIROPRACT MANJ 3-4 REGIONS: CPT | Mod: AT | Performed by: CHIROPRACTOR

## 2020-02-12 NOTE — PROGRESS NOTES
Visit #:  2    Subjective:  Aria Cuevas is a 44 year old female who is seen in f/u up for:        Somatic dysfunction of lumbar region  Chronic right-sided low back pain without sciatica  Somatic dysfunction of pelvis region  Somatic dysfunction of sacral region  Hip pain, right.     Since last visit on 2/4/2020,  Aria Cuevas reports:    Area of chief complaint:  Thoracic and Lumbar :  Symptoms are graded at 3/10. The quality is described as stiff, achey, dull.  Motion has increased, but is still not normal. The pt reports improvement in her posture when using the lumbar support. . Patient feels that they are improved due to a reduction in symptoms. The pt reports at least 10% improvement since initial presentation. She denies significant improvement in the R anterior  hip area. The pt would like to try ACP. She denies weakness or other unusual sx.     Since last visit the patient feels that they are 0-10 percent  improved from last visit.       Objective:  The following was observed:    P: palpatory tenderness Lumbar erector spine and Quad lumb R>>L  A: static palpation demonstrates intersegmental asymmetry   R: motion palpation notes restricted motion  T: hypertonicity at: R hip flexor, R QL    Segmental spinal dysfunction/restrictions found at:  T6 , T9 , L5 , Sacrum  and PSIS Right       Assessment:    Diagnoses:      1. Somatic dysfunction of lumbar region    2. Chronic right-sided low back pain without sciatica    3. Somatic dysfunction of pelvis region    4. Somatic dysfunction of sacral region    5. Hip pain, right        Patient's condition:  Patient had restrictions pre-manipulation    Treatment effectiveness:  Post manipulation there is better intersegmental movement and Patient claims to feel looser post manipulation      Procedures:  CMT:  07555 Chiropractic manipulative treatment 3-4 regions performed   Thoracic: Diversified, T6, T9 Prone  Lumbar: Diversified, L5, Side posture  Pelvis:  Drop Table, Sacrum , PSIS Right , Prone  T6 , T9 , L5 , Sacrum  and PSIS Right     Modalities:  61427: Acupuncture, for 15 minutes:  Points: Howard Points in lumbar spine  Ahsi point in hips and legs   For 15 minutes    Therapeutic procedures:  None    Response to Treatment  No increase in sx, pt stable     Prognosis: Guarded     Progress towards Goals: Patient is making progress towards the goal.     Recommendations:    Instructions:  continue to use lumbar support     Follow-up:    Return to care in  Week with ACP supine for hip flexor .       \

## 2020-02-14 PROBLEM — M25.551 HIP PAIN, RIGHT: Status: ACTIVE | Noted: 2019-10-16

## 2020-02-18 ENCOUNTER — THERAPY VISIT (OUTPATIENT)
Dept: CHIROPRACTIC MEDICINE | Facility: CLINIC | Age: 45
End: 2020-02-18
Attending: PHYSICAL MEDICINE & REHABILITATION
Payer: COMMERCIAL

## 2020-02-18 DIAGNOSIS — M99.05 SOMATIC DYSFUNCTION OF PELVIS REGION: ICD-10-CM

## 2020-02-18 DIAGNOSIS — M99.04 SOMATIC DYSFUNCTION OF SACRAL REGION: ICD-10-CM

## 2020-02-18 DIAGNOSIS — G89.29 CHRONIC RIGHT-SIDED LOW BACK PAIN WITHOUT SCIATICA: ICD-10-CM

## 2020-02-18 DIAGNOSIS — M25.551 HIP PAIN, RIGHT: ICD-10-CM

## 2020-02-18 DIAGNOSIS — M99.03 SOMATIC DYSFUNCTION OF LUMBAR REGION: Primary | ICD-10-CM

## 2020-02-18 DIAGNOSIS — M54.50 CHRONIC RIGHT-SIDED LOW BACK PAIN WITHOUT SCIATICA: ICD-10-CM

## 2020-02-18 PROCEDURE — 97810 ACUP 1/> WO ESTIM 1ST 15 MIN: CPT | Mod: GA | Performed by: CHIROPRACTOR

## 2020-02-18 PROCEDURE — 98941 CHIROPRACT MANJ 3-4 REGIONS: CPT | Mod: AT | Performed by: CHIROPRACTOR

## 2020-02-18 NOTE — PROGRESS NOTES
Visit #:  3    Subjective:  Aria Cuevas is a 44 year old female who is seen in f/u up for:        Somatic dysfunction of lumbar region  Chronic right-sided low back pain without sciatica  Somatic dysfunction of sacral region  Somatic dysfunction of pelvis region  Hip pain, right.     Since last visit,  Aria Cuevas reports:    Area of chief complaint:  Thoracic and Lumbar :  Symptoms are graded at 3/10. The quality is described as stiff, achey, dull.  Motion has increased, but is still not normal. The pt reports no significant change in the R SI joint area. She denies increased pain in the low back post treatment. Today she reports R SI joint pain. She denies weakness in the extremities or other unusual sx.     Since last visit the patient feels that they are 0-10 percent  improved from last visit-no change        Objective:  The following was observed:    P: palpatory tenderness Lumbar erector spine and Quad lumb R>>L  A: static palpation demonstrates intersegmental asymmetry   R: motion palpation notes restricted motion  T: hypertonicity at: R hip flexor, R QL    Segmental spinal dysfunction/restrictions found at:  T6 , T9 , L5 , Sacrum  and PSIS Right       Assessment:    Diagnoses:      1. Somatic dysfunction of lumbar region    2. Chronic right-sided low back pain without sciatica    3. Somatic dysfunction of sacral region    4. Somatic dysfunction of pelvis region    5. Hip pain, right        Patient's condition:  No change     Treatment effectiveness:  Post manipulation there is better intersegmental movement and Patient claims to feel looser post manipulation      Procedures:  CMT:  81209 Chiropractic manipulative treatment 3-4 regions performed   Thoracic: Diversified, T6, T9 Prone  Lumbar: Diversified, L5, Side posture  Pelvis: Drop Table, Sacrum , PSIS Right , Prone  Pili points to the R anterior hip prone    Modalities:  20719: Acupuncture, for 15 minutes:  Points: Howard Points in  lumbar spine  Ahsi point in hips and legs   For 15 minutes    Therapeutic procedures:  None    Response to Treatment  No increase in sx, pt stable     Prognosis: Guarded     Progress towards Goals: Patient is making progress towards the goal.     Recommendations:    Instructions:  continue to use lumbar support     Follow-up:    Return to care in week

## 2020-02-27 ENCOUNTER — THERAPY VISIT (OUTPATIENT)
Dept: CHIROPRACTIC MEDICINE | Facility: CLINIC | Age: 45
End: 2020-02-27
Payer: COMMERCIAL

## 2020-02-27 DIAGNOSIS — M99.03 SOMATIC DYSFUNCTION OF LUMBAR REGION: Primary | ICD-10-CM

## 2020-02-27 DIAGNOSIS — M99.04 SOMATIC DYSFUNCTION OF SACRAL REGION: ICD-10-CM

## 2020-02-27 DIAGNOSIS — G89.29 CHRONIC RIGHT-SIDED LOW BACK PAIN WITHOUT SCIATICA: ICD-10-CM

## 2020-02-27 DIAGNOSIS — M54.50 CHRONIC RIGHT-SIDED LOW BACK PAIN WITHOUT SCIATICA: ICD-10-CM

## 2020-02-27 DIAGNOSIS — M99.05 SOMATIC DYSFUNCTION OF PELVIS REGION: ICD-10-CM

## 2020-02-27 DIAGNOSIS — M25.551 HIP PAIN, RIGHT: ICD-10-CM

## 2020-02-27 PROCEDURE — 98941 CHIROPRACT MANJ 3-4 REGIONS: CPT | Mod: AT | Performed by: CHIROPRACTOR

## 2020-02-27 PROCEDURE — 97810 ACUP 1/> WO ESTIM 1ST 15 MIN: CPT | Mod: GA | Performed by: CHIROPRACTOR

## 2020-02-27 NOTE — PROGRESS NOTES
Visit #:  4    Subjective:  Aria Cuevas is a 44 year old female who is seen in f/u up for:        Somatic dysfunction of lumbar region  Chronic right-sided low back pain without sciatica  Somatic dysfunction of pelvis region  Hip pain, right  Somatic dysfunction of sacral region.     Since last visit,  Aria Cuevas reports:    Area of chief complaint:  Thoracic and Lumbar :  Symptoms are graded at 3/10. The quality is described as stiff, achey, dull.  Motion has increased, but is still not normal. The pt reports no significant change in the R SI joint area and flank region. She denies increased pain in the low back post treatment. The pt denies weakness in the extremities or other unusual sx.     Since last visit the patient feels that they are 0-10 percent  improved from last visit-no change        Objective:  The following was observed:    P: palpatory tenderness Lumbar erector spine and Quad lumb R>>L  A: static palpation demonstrates intersegmental asymmetry   R: motion palpation notes restricted motion  T: hypertonicity at: R hip flexor, R QL    Segmental spinal dysfunction/restrictions found at:  T6 , T9 , L5 , Sacrum  and PSIS Right       Assessment:    Diagnoses:      1. Somatic dysfunction of lumbar region    2. Chronic right-sided low back pain without sciatica    3. Somatic dysfunction of pelvis region    4. Hip pain, right    5. Somatic dysfunction of sacral region        Patient's condition:  No change     Treatment effectiveness:  Post manipulation there is better intersegmental movement and Patient claims to feel looser post manipulation      Procedures:  CMT:  63997 Chiropractic manipulative treatment 3-4 regions performed   Thoracic: Diversified, T6, T9 Prone  Lumbar: Diversified, L5, Side posture  Pelvis: Drop Table, Sacrum , PSIS Right , Prone  Pili points to the R anterior hip prone    Modalities:  15953: Acupuncture, for 15 minutes:  Points: Howard Points in lumbar  spine  Ahsi point in hips and legs   For 15 minutes    Therapeutic procedures:  None    Response to Treatment  No increase in sx, pt stable     Prognosis: Guarded     Progress towards Goals: Patient is making progress towards the goal.     Recommendations:    Instructions:  continue to use lumbar support     Follow-up:    Return to care in week

## 2021-01-15 ENCOUNTER — HEALTH MAINTENANCE LETTER (OUTPATIENT)
Age: 46
End: 2021-01-15

## 2021-07-13 ENCOUNTER — NURSE TRIAGE (OUTPATIENT)
Dept: NURSING | Facility: CLINIC | Age: 46
End: 2021-07-13

## 2021-07-14 ENCOUNTER — OFFICE VISIT (OUTPATIENT)
Dept: URGENT CARE | Facility: URGENT CARE | Age: 46
End: 2021-07-14
Payer: COMMERCIAL

## 2021-07-14 VITALS
OXYGEN SATURATION: 99 % | RESPIRATION RATE: 12 BRPM | TEMPERATURE: 97.1 F | DIASTOLIC BLOOD PRESSURE: 72 MMHG | HEART RATE: 74 BPM | SYSTOLIC BLOOD PRESSURE: 113 MMHG

## 2021-07-14 DIAGNOSIS — T63.441A BEE STING REACTION, ACCIDENTAL OR UNINTENTIONAL, INITIAL ENCOUNTER: Primary | ICD-10-CM

## 2021-07-14 PROCEDURE — 99213 OFFICE O/P EST LOW 20 MIN: CPT | Performed by: PHYSICIAN ASSISTANT

## 2021-07-14 RX ORDER — CLOBETASOL PROPIONATE 0.5 MG/G
OINTMENT TOPICAL 2 TIMES DAILY
Qty: 15 G | Refills: 0 | Status: SHIPPED | OUTPATIENT
Start: 2021-07-14 | End: 2021-07-21

## 2021-07-14 NOTE — PROGRESS NOTES
Assessment & Plan     Bee sting reaction, accidental or unintentional, initial encounter  - clobetasol (TEMOVATE) 0.05 % external ointment  Dispense: 15 g; Refill: 0  See patient instructions    Return in about 1 week (around 7/21/2021) for If not better, sooner if worsening.     Diagnosis and treatment plan were discussed with patient and/or parent. If symptoms worsen or do not improve in the next few days, follow-up with your primary care provider or visit an [facility] urgent care clinic location.  Patient verbalizes understanding of all things discussed. All questions were addressed and answered.     Neeta Mejía PA-C  [facility] MARCUS Knapp is a 45 year old female who presents to clinic today for the following health issues:  Chief Complaint   Patient presents with     Urgent Care     Insect Bites     Left thigh bee sting-48hrs ago-Redness and swelling-Also having a itching sensation in lower left abdomen-Denies any emergent sx     HPI  Bee sting left thigh 48 hours ago.   Area is itchy and painful. Denies throat or facial swelling. Denies shortness of breath.  Took claritin, benadryl, and ibuprofen. She has also tried hydrocortisone cream. These treatments were not noticeably helpful. She has also tried icing the area without relief.    Review of Systems  Constitutional, HEENT, cardiovascular, pulmonary, gi and gu systems are negative, except as otherwise noted.      Objective    /72   Pulse 74   Temp 97.1  F (36.2  C) (Tympanic)   Resp 12   LMP 06/30/2021   SpO2 99%   Breastfeeding No   Physical Exam    GENERAL: healthy, alert and no distress  NECK: no adenopathy, no asymmetry, masses, or scars and thyroid normal to palpation  RESP: lungs clear to auscultation - no rales, rhonchi or wheezes  CV: regular rate and rhythm, normal S1 S2, no S3 or S4, no murmur, click or rub, no peripheral edema and peripheral pulses strong  ABDOMEN: soft,  nontender, no hepatosplenomegaly, no masses and bowel sounds normal  MS: no gross musculoskeletal defects noted, no edema  Left thigh: mild edema mid inner thigh with slight pitting, skin intact, no erythema, no rash.

## 2021-07-14 NOTE — TELEPHONE ENCOUNTER
Pt called in states she had bee sting yesterday.  The sting is no her left thigh.  The swelling is 10 inch by 10 inch.  There is redness and hot.  There pain is 3/10 on the scale.  It does itching is 8/10 on the scale.  No breathing issue.  No past allergy reaction.  No other symptoms.  Pt is not pregnant.  The desponsation is to be seen today.  Care advice given per protocol.  Patient agrees with care advice given.   Agreed to call back if he has additional symptoms or questions.      Kory Sykes [place] Nurse Advisor 7/13/2021 9:18 PM         Additional Information    Negative: Passed out (i.e., fainted, collapsed and was not responding)    Negative: Wheezing or difficulty breathing    Negative: Hoarseness, cough, or tightness in the throat or chest    Negative: Swollen tongue or difficulty swallowing    Negative: Life-threatening reaction in past to sting (anaphylaxis) and < 2 hours since sting    Negative: Sounds like a life-threatening emergency to the triager    Negative: Not a bee, wasp, hornet, or yellow jacket sting    Negative: Widespread hives, itching, or facial swelling and started within 2 hours of sting    Negative: Vomiting or abdominal cramps and started within 2 hours of sting    Negative: Gave epinephrine shot and no symptoms now    Negative: Patient sounds very sick or weak to the triager    Negative: Sting inside the mouth    Negative: Sting on eyeball (e.g., cornea)    Negative: More than 50 stings    Negative: Fever and area is red    Negative: Fever and area is very tender to touch    Negative: Red streak or red line and length > 2 inches (5 cm)    Swelling is huge (e.g., > 4 inches or 10 cm, spreads beyond wrist or ankle)    Negative: Red or very tender (to touch) area, getting larger over 48 hours after the sting    Negative: Red or very tender (to touch) area, and started over 24 hours after the sting    Protocols used: BEE STING-A-OH

## 2021-07-14 NOTE — PATIENT INSTRUCTIONS
"You can try over the counter zyrtec nighty until symptoms resolve or two 25mg tabs of benadryl at night.   Use the ointment 2 times a day for 7 days as needed for itching.   Ice the area periodically during the day.     Patient Education     Local Reaction to an Insect Sting    You have been stung or bitten by an insect. The insect s venom or body fluid is causing your skin to react in the area where you were stung or bitten. This often causes redness, itching, and swelling. This reaction will often fade over a few hours. But it can last a few days. An insect bite or sting can become infected 1 to 3 days later. So watch for the signs below. Sometimes it is hard to tell the difference between a local reaction to the insect bite or sting and an early infection. Your healthcare provider may give you antibiotics.  Common stinging insects that cause reactions are wasps, bees, yellow jackets, fire ants, and hornets. Common bites are from spiders, mosquitoes, fleas, or ticks. Other types of insects may be more common in different parts of the country or world.  Insect venom causes \"local\" toxic reactions in everyone. Allergic reactions occur only in those who are already sensitive to the venom. The severity of your reaction to the insect sting depends on the dose of venom and the degree to which you are already sensitive to it. Most people think of allergic reactions when someone has a rash or itchy skin. But most stings cause \"localized\" symptoms that are not allergic. These symptoms can include:    Rash, redness, welts, or blisters around the sting or bite    Itching, burning, stinging, or pain    Swelling around the sting area, which may spread and become uncomfortable    Home care  Medicines  The healthcare provider may prescribe medicines to ease swelling, itching, and pain. Follow the provider s instructions when taking these medicines.    Diphenhydramine is an oral antihistamine you can find in stores.. You can use " this medicine to reduce itching and swelling. The medicine may make you sleepy. So be careful using it in the daytime or when going to school, working, or driving. Don t use diphenhydramine if you have glaucoma or if you are a man with trouble urinating because of an enlarged prostate. Other antihistamines may cause less drowsiness. They may be better choices for daytime use. Ask your pharmacist for suggestions.    If you have large areas of localized swelling, you may be prescribed oral corticosteroids, such as prednisone. These can help decrease the swelling and discomfort.    Don t use diphenhydramine cream on your skin. In some people, it can cause more of a localized skin rash due to allergy to the cream.    Calamine lotion or oatmeal baths sometimes help with itching.    You may use acetaminophen or ibuprofen to control pain, unless another pain medicine was prescribed. Talk with your healthcare provider before using these medicines if you have chronic liver or kidney disease. Also talk with your provider if you ve had a stomach ulcer or gastrointestinal (GI) bleeding.    If you had a severe reaction, the provider may prescribe an epinephrine auto-injector. Epinephrine is an emergency medicine that will stop an allergic reaction from getting worse. Before you leave the hospital, be sure that you understand when and how to use this medicine.  General care      If itching is a problem, don t take hot showers or baths. Stay out of direct sunlight. These heat up your skin and will make the itching worse.    Use an ice pack to reduce local areas of redness, swelling, and itching. You can make your own ice pack by putting ice cubes in a bag that seals and wrapping it in a thin towel. Don t put the ice directly on your skin, because it can damage the skin.    Try not to scratch any affected areas to prevent damage to the skin or infection.    If oral antibiotics or oral corticosteroids were prescribed, be sure to  take them as directed until finished.  Tips for dealing with insect stings    Be aware that honeybees nest in trees. Wasps and yellow jackets nest in the ground, trees, or roof eaves.    If you are stung by a honeybee, a stinger may remain in your skin. Wasps, yellow jackets, and hornets don t leave a stinger behind. Move away from the nest area right away. The stinger of a honeybee releases a substance that will attract other bees to you. Once you are away from the nest, remove the stinger as quickly as possible.    After any sting, you may apply ice and take diphenhydramine or another antihistamine. If you develop any of the warning signs below, seek help right away.    If your reaction includes dizziness, fainting, or trouble breathing or swallowing, ask your healthcare provider if you need epinephrine auto-injectors.    Follow-up care  Follow up with your healthcare provider in 2 days, or as advised, if your symptoms don t start to get better.  Call 911  Call 911 if any of these occur:    Trouble breathing or swallowing, or wheezing    New or worsening swelling in the mouth, throat, or tongue    Hoarse voice or trouble speaking    Confusion    Severe drowsiness or trouble awakening    Fainting or loss of consciousness    Rapid heart rate    Low blood pressure    Feeling of doom    Nausea, vomiting, abdominal pain, or diarrhea    Vomiting blood, or large amounts of blood in stool    Seizure  When to seek medical advice  Call your healthcare provider or get medical care right away if any of these occur:    Spreading areas of itching, redness, or swelling    New or worse swelling in the face, eyelids, or lips    Dizziness or weakness  Also call your provider right away if you have signs of infection:    Increasing pain, redness, or swelling    Fever of 100.4 F (38 C) or higher, or as directed by your healthcare provider    Fluid or pus draining from the sting area   [employer] last reviewed this educational content  on 10/1/2019    6560-8641 [employer]. All rights reserved. This information is not intended as a substitute for professional medical care. Always follow your healthcare professional's instructions.

## 2021-09-04 ENCOUNTER — HEALTH MAINTENANCE LETTER (OUTPATIENT)
Age: 46
End: 2021-09-04

## 2022-02-19 ENCOUNTER — HEALTH MAINTENANCE LETTER (OUTPATIENT)
Age: 47
End: 2022-02-19

## 2022-10-16 ENCOUNTER — HEALTH MAINTENANCE LETTER (OUTPATIENT)
Age: 47
End: 2022-10-16

## 2023-03-16 ENCOUNTER — LAB REQUISITION (OUTPATIENT)
Dept: LAB | Facility: CLINIC | Age: 48
End: 2023-03-16
Payer: COMMERCIAL

## 2023-03-16 ENCOUNTER — TRANSFERRED RECORDS (OUTPATIENT)
Dept: HEALTH INFORMATION MANAGEMENT | Facility: CLINIC | Age: 48
End: 2023-03-16

## 2023-03-16 DIAGNOSIS — N84.1 POLYP OF CERVIX UTERI: ICD-10-CM

## 2023-03-16 DIAGNOSIS — Z01.419 ENCOUNTER FOR GYNECOLOGICAL EXAMINATION (GENERAL) (ROUTINE) WITHOUT ABNORMAL FINDINGS: ICD-10-CM

## 2023-03-16 PROCEDURE — 87624 HPV HI-RISK TYP POOLED RSLT: CPT | Mod: ORL | Performed by: NURSE PRACTITIONER

## 2023-03-16 PROCEDURE — 88305 TISSUE EXAM BY PATHOLOGIST: CPT | Mod: TC,ORL | Performed by: NURSE PRACTITIONER

## 2023-03-16 PROCEDURE — 88305 TISSUE EXAM BY PATHOLOGIST: CPT | Mod: 26 | Performed by: PATHOLOGY

## 2023-03-16 PROCEDURE — G0145 SCR C/V CYTO,THINLAYER,RESCR: HCPCS | Mod: ORL | Performed by: NURSE PRACTITIONER

## 2023-03-20 LAB
PATH REPORT.COMMENTS IMP SPEC: NORMAL
PATH REPORT.COMMENTS IMP SPEC: NORMAL
PATH REPORT.FINAL DX SPEC: NORMAL
PATH REPORT.GROSS SPEC: NORMAL
PATH REPORT.MICROSCOPIC SPEC OTHER STN: NORMAL
PATH REPORT.RELEVANT HX SPEC: NORMAL
PHOTO IMAGE: NORMAL

## 2023-03-21 LAB
BKR LAB AP GYN ADEQUACY: NORMAL
BKR LAB AP GYN INTERPRETATION: NORMAL
BKR LAB AP HPV REFLEX: NORMAL
BKR LAB AP LMP: NORMAL
BKR LAB AP PREVIOUS ABNL DX: NORMAL
BKR LAB AP PREVIOUS ABNORMAL: NORMAL
PATH REPORT.COMMENTS IMP SPEC: NORMAL
PATH REPORT.COMMENTS IMP SPEC: NORMAL
PATH REPORT.RELEVANT HX SPEC: NORMAL

## 2023-03-22 LAB
HUMAN PAPILLOMA VIRUS 16 DNA: NEGATIVE
HUMAN PAPILLOMA VIRUS 18 DNA: NEGATIVE
HUMAN PAPILLOMA VIRUS FINAL DIAGNOSIS: NORMAL
HUMAN PAPILLOMA VIRUS OTHER HR: NEGATIVE

## 2023-04-01 ENCOUNTER — HEALTH MAINTENANCE LETTER (OUTPATIENT)
Age: 48
End: 2023-04-01

## 2023-04-21 ENCOUNTER — TELEPHONE (OUTPATIENT)
Dept: FAMILY MEDICINE | Facility: CLINIC | Age: 48
End: 2023-04-21

## 2023-04-21 NOTE — TELEPHONE ENCOUNTER
Pt calls with questions about travel clinic.     Pt informs she has upcoming travel planned to [facility] and wonders if a travel clinic appt is required. Advised pt can either check [employer] website to see if there are required vaccines or if she does not want to do the leg work, she can schedule appt to discuss requirements with provider. Informed pt that if she would like antibiotics to have on hand or any other medications for travel, then a travel clinic appt would be indicated.    Patient was given an opportunity to ask questions, verbalized understanding of plan, and is agreeable.      Jennifer Harvey RN

## 2023-05-04 ENCOUNTER — OFFICE VISIT (OUTPATIENT)
Dept: FAMILY MEDICINE | Facility: CLINIC | Age: 48
End: 2023-05-04
Payer: COMMERCIAL

## 2023-05-04 VITALS
WEIGHT: 111.1 LBS | TEMPERATURE: 97.3 F | BODY MASS INDEX: 20.44 KG/M2 | DIASTOLIC BLOOD PRESSURE: 68 MMHG | HEIGHT: 62 IN | HEART RATE: 67 BPM | OXYGEN SATURATION: 97 % | RESPIRATION RATE: 16 BRPM | SYSTOLIC BLOOD PRESSURE: 101 MMHG

## 2023-05-04 DIAGNOSIS — Z71.84 ENCOUNTER FOR COUNSELING FOR TRAVEL: Primary | ICD-10-CM

## 2023-05-04 DIAGNOSIS — Z23 NEED FOR IMMUNIZATION AGAINST TYPHOID: ICD-10-CM

## 2023-05-04 PROCEDURE — 90691 TYPHOID VACCINE IM: CPT | Mod: GA | Performed by: PHYSICIAN ASSISTANT

## 2023-05-04 PROCEDURE — 99401 PREV MED CNSL INDIV APPRX 15: CPT | Mod: 25 | Performed by: PHYSICIAN ASSISTANT

## 2023-05-04 PROCEDURE — 90471 IMMUNIZATION ADMIN: CPT | Mod: GA | Performed by: PHYSICIAN ASSISTANT

## 2023-05-04 RX ORDER — AZITHROMYCIN 500 MG/1
TABLET, FILM COATED ORAL
Qty: 3 TABLET | Refills: 0 | Status: SHIPPED | OUTPATIENT
Start: 2023-05-04 | End: 2023-12-04

## 2023-05-04 RX ORDER — ATOVAQUONE AND PROGUANIL HYDROCHLORIDE 250; 100 MG/1; MG/1
1 TABLET, FILM COATED ORAL DAILY
Qty: 16 TABLET | Refills: 0 | Status: SHIPPED | OUTPATIENT
Start: 2023-05-04 | End: 2023-12-04

## 2023-05-04 ASSESSMENT — PAIN SCALES - GENERAL: PAINLEVEL: NO PAIN (0)

## 2023-05-04 NOTE — PROGRESS NOTES
SUBJECTIVE: Aria Cuevas , a 47 year old  female, presents for counseling and information regarding upcoming travel to [facility]. Special medical concerns include: None. She anticipates the following unusual exposures: None.    Itinerary:  [facility]    Departure Date: 6/25/2023 Return date: 7/10/2023    Reason for travel (i.e. Business, pleasure): Pleasure    Visiting an urban or rural area?: Some of each    Accommodations (i.e. hotel, hostel, friends, family, etc): Air BnB    Women - First day of your last period: 4/24/23    IMMUNIZATION HISTORY  Have you received any vaccinations in the past 4 weeks?  No  Have you ever fainted from having your blood drawn or from an injection?  No  Have you ever had a fever reaction to vaccination?  Yes  Have you ever had any bad reaction or side effect from any vaccination?  No  Have you ever had hepatitis A or B vaccine?  Yes  Do you live (or work closely) with anyone who has AIDS, an AIDS-like condition, any other immune disorder or who is on chemotherapy for cancer?  No  Have you received any injection of immune globulin or any blood products during the past 12 months?  No    GENERAL MEDICAL HISTORY  Do you have a medical condition that warrants maintenance medication or physician follow-up?  No  Do you have a medical condition that is stable now, but that may recur while traveling?  No  Has your spleen been removed?  No  Have you had an acute illness or a fever in the past 48 hours?  No  Are you pregnant, or might you become pregnant on this trip?  Any chance of pregnancy?  No  Are you breastfeeding?  No  Do you have HIV, AIDS, an AIDS-like condition, any other immune disorder, leukemia or cancer?  No  Do you have a severe combined immunodeficiency disease?  No  Have you had your thymus gland removed or history of problems with your thymus, such as myasthenia gravis, DiGeorge syndrome, or thymoma?  No    Do you have severe thrombocytopenia (low platelet count) or a  coagulation disorder?  No  Have you ever had a convulsion, seizure, epilepsy, neurologic condition or brain infection?  No  Do you have any stomach conditions?  No  Do you have a G6PD deficiency?  No  Do you have severe renal or kidney impairment?  No  Do you have a history of psychiatric problems?  No  Do you have a problem with strange dreams and/or nightmares?  No  Do you have insomnia?  No  Do you have problems with vaginitis?  No  Do you have psoriasis?  No  Are you prone to motion sickness?  No  Have you ever had headaches, nausea, vomiting, or breathing problems from altitude exposure?  No      Past Medical History:   Diagnosis Date     Iliac fossa pain, right       Immunization History   Administered Date(s) Administered     COVID-19 Bivalent 12+ (Pfizer) 09/16/2022     COVID-19 Monovalent 18+ (Moderna) 04/16/2021, 05/13/2021, 11/29/2021     Hepatitis A (ADULT 19+) 07/10/2018, 06/28/2019     Influenza Vaccine >6 months (Alfuria,Fluzone) 12/22/2019, 09/09/2020     Influenza Vaccine, 6+MO IM (QUADRIVALENT W/PRESERVATIVES) 12/22/2019     Influenza,INJ,MDCK,PF,Quad >6mo(Flucelvax) 11/16/2021, 09/16/2022     TDAP Vaccine (Adacel) 07/10/2018     Typhoid IM 07/10/2018       Current Outpatient Medications   Medication Sig Dispense Refill     cyclobenzaprine (FLEXERIL) 5 MG tablet Take 1-2 tablets (5-10 mg) by mouth nightly as needed for muscle spasms (Patient not taking: Reported on 7/14/2021) 30 tablet 1     Allergies   Allergen Reactions     No Known Drug Allergy         EXAM: deferred    Immunizations discussed include: Typhoid  Malaria prophylaxis recommended: Malarone  Symptomatic treatment for traveler's diarrhea: bismuth subsalicylate, loperamide/diphenoxylate and azithromycin    ASSESSMENT/PLAN:    (Z71.84) Encounter for counseling for travel  (primary encounter diagnosis)    Comment: Typhoid vaccines today. Patient will return or follow-up with PCP as needed. Prophylaxis given for Traveler's diarrhea and  Malaria. All questions were answered.     Plan: atovaquone-proguanil (MALARONE) 250-100 MG         tablet, azithromycin (ZITHROMAX) 500 MG tablet            (Z23) Need for immunization against typhoid  Comment:   Plan: TYPHOID VACCINE, IM              I have reviewed general recommendations for safe travel   including: food/water precautions, insect avoidance, safe sex   practices given high prevalence of HIV and other STDs,   roadway safety. Educational materials and links to the CDC   Traveler's health website have been provided.    Total time 15 minutes, greater than 50 percent in counseling   and coordination of care.

## 2023-05-04 NOTE — PATIENT INSTRUCTIONS
"See travel packet provided  Recommend ultrathon (mosquito repellant), pepto bismol and imodium  The food and drink choices you make while traveling can impact your likelihood of getting sick.   If you aren't sure if a food or drink is safe, the saying \" BOIL IT, COOK IT, PEEL IT, OR FORGET IT\" can help you decide whether it's okay to consume.   Also bring hand  and sun screen with you.  Safe Travels       Today May 4, 2023 you received the    Typhoid - injectable. This vaccine is valid for two years. .    These appointments can be made as a NURSE ONLY visit.    **It is very important for the vaccinations to be given on the scheduled day(s), this helps ensure you receive the full effectiveness of the vaccine.**    Please call [facility] with any questions [phone]    Thank you for visiting [facility]    "

## 2023-10-14 ENCOUNTER — OFFICE VISIT (OUTPATIENT)
Dept: URGENT CARE | Facility: URGENT CARE | Age: 48
End: 2023-10-14
Payer: COMMERCIAL

## 2023-10-14 VITALS
DIASTOLIC BLOOD PRESSURE: 65 MMHG | BODY MASS INDEX: 19 KG/M2 | SYSTOLIC BLOOD PRESSURE: 102 MMHG | HEART RATE: 80 BPM | WEIGHT: 103.9 LBS | OXYGEN SATURATION: 99 % | TEMPERATURE: 98.6 F

## 2023-10-14 DIAGNOSIS — H92.01 OTALGIA, RIGHT: Primary | ICD-10-CM

## 2023-10-14 PROCEDURE — 99213 OFFICE O/P EST LOW 20 MIN: CPT | Performed by: PHYSICIAN ASSISTANT

## 2023-10-14 RX ORDER — NAPROXEN 500 MG/1
500 TABLET ORAL 2 TIMES DAILY WITH MEALS
Qty: 14 TABLET | Refills: 0 | Status: SHIPPED | OUTPATIENT
Start: 2023-10-14 | End: 2023-10-21

## 2023-10-14 NOTE — PROGRESS NOTES
Assessment & Plan     Otalgia, right  No evidence of acute otitis media or acute otitis externa.  No evidence of foreign body or cerumen impaction in the right ear canal.  No evidence of mastoiditis, there is no erythema or swelling right mastoid bone region.  No open wounds or sores. No rash.  Unclear etiology at this time. Recommended naproxen as needed for pain.  Warm compresses to the affected area.  Advised to keep monitoring symptoms.  Follow-up if any worsening symptoms.  Patient agrees with the plan.  - naproxen (NAPROSYN) 500 MG tablet  Dispense: 14 tablet; Refill: 0       Return in about 5 days (around 10/19/2023) for Symptoms failing to improve.    Mayra Sainz PA-C  [facility] MARCUS Knapp is a 48 year old female who presents to clinic today for the following health issues:  Chief Complaint   Patient presents with    Ear Problem     R ear pain- inside ear and sharp pain to the touch on outer backside of ear- x1 day     HPI    Patient is presenting to urgent care today with a complaint of right posterior auricular pain.  Onset since this morning.  Pain is intermittent.  Pain is sharp.  Patient otherwise denies any drainage from the right ear, no recent cold symptoms.  No fevers or chills.  No ST. No injury to the affected area.  Treatment tried: None.      Review of Systems  Constitutional, HEENT, cardiovascular, pulmonary, GI, , musculoskeletal, neuro, skin, endocrine and psych systems are negative, except as otherwise noted.      Objective    /65   Pulse 80   Temp 98.6  F (37  C) (Oral)   Wt 47.1 kg (103 lb 14.4 oz)   LMP 09/25/2023 (Approximate)   SpO2 99%   BMI 19.00 kg/m    Physical Exam   GENERAL: healthy, alert and no distress  HENT: ear canals and TM's normal, right pinna is normal, right earlobe is normal, no rash, open wounds or sores, no lesions noted right posterior auricular region, no erythema over the mastoid bone.  Patient is  tender to palpation posterior auricular region adjacent to the earlobe. Throat is mist and pink  NECK: ROM is normal.  RESP: Normal breathing effort  NEURO: She is alert and oriented

## 2023-10-14 NOTE — PATIENT INSTRUCTIONS
No evidence of ear infection today.  Please keep monitoring symptoms.  Take naproxen as needed for pain.  Please follow-up if any worsening symptoms.

## 2023-11-04 ENCOUNTER — HEALTH MAINTENANCE LETTER (OUTPATIENT)
Age: 48
End: 2023-11-04

## 2023-11-22 ENCOUNTER — OFFICE VISIT (OUTPATIENT)
Dept: URGENT CARE | Facility: URGENT CARE | Age: 48
End: 2023-11-22
Payer: COMMERCIAL

## 2023-11-22 VITALS
RESPIRATION RATE: 16 BRPM | DIASTOLIC BLOOD PRESSURE: 70 MMHG | TEMPERATURE: 97.5 F | SYSTOLIC BLOOD PRESSURE: 96 MMHG | HEART RATE: 77 BPM | OXYGEN SATURATION: 100 %

## 2023-11-22 DIAGNOSIS — R05.9 COUGH, UNSPECIFIED TYPE: Primary | ICD-10-CM

## 2023-11-22 PROCEDURE — 99213 OFFICE O/P EST LOW 20 MIN: CPT | Performed by: FAMILY MEDICINE

## 2023-11-22 RX ORDER — BENZONATATE 100 MG/1
100-200 CAPSULE ORAL 3 TIMES DAILY PRN
Qty: 30 CAPSULE | Refills: 0 | Status: SHIPPED | OUTPATIENT
Start: 2023-11-22 | End: 2023-11-29

## 2023-11-22 RX ORDER — ALBUTEROL SULFATE 90 UG/1
2 AEROSOL, METERED RESPIRATORY (INHALATION) EVERY 4 HOURS PRN
Qty: 18 G | Refills: 0 | Status: SHIPPED | OUTPATIENT
Start: 2023-11-22 | End: 2023-12-20

## 2023-11-22 NOTE — PATIENT INSTRUCTIONS
Tessalon perles 1-2 pills every 8 hours as needed for coughing.    Albuterol inhaler 2 puffs every 4 hours as needed for coughing/sensation of shortness of breath.

## 2023-11-22 NOTE — PROGRESS NOTES
ICD-10-CM    1. Cough, unspecified type  R05.9 benzonatate (TESSALON) 100 MG capsule     albuterol (PROAIR HFA/PROVENTIL HFA/VENTOLIN HFA) 108 (90 Base) MCG/ACT inhaler        Likely prolonged post-viral cough.  Very low suspicion for bacterial etiology given afebrile, 100% O2 sat, and normal lung exam today.  No pharyngeal erythema to suggest significant acid reflux or postnasal drip contributing to ongoing cough at this time.  Will trial Tessalon and bronchodilator to see if these are helpful.  Would consider spirometry if not improving.    PLAN:  Patient Instructions   Tessalon perles 1-2 pills every 8 hours as needed for coughing.    Albuterol inhaler 2 puffs every 4 hours as needed for coughing/sensation of shortness of breath.    SUBJECTIVE:  Aria Cuevas is a 48 year old female who presents to  today with cough x 6-7 weeks  No fevers.  Exposed to pneumonia and bronchitis within her household recently.  Has been using cough drops, fluids, and steam inhalation to manage symptoms.  Sometimes feels like she can't get a deep breath in.  Cough is occasionally minimally productive.  Seems worst at night.  No frequent throat-clearing.    No history of asthma.  No heartburn.    OBJECTIVE:  BP 96/70 (BP Location: Right arm, Patient Position: Sitting, Cuff Size: Adult Regular)   Pulse 77   Temp 97.5  F (36.4  C) (Tympanic)   Resp 16   LMP 09/25/2023 (Approximate)   SpO2 100%   GEN: well-appearing, in NAD  ENT: TMs normal, oral MMM, normal pharynx  Neck: no LAD  Lungs:  CTAB, good air entry bilaterally

## 2023-12-04 ENCOUNTER — OFFICE VISIT (OUTPATIENT)
Dept: URGENT CARE | Facility: URGENT CARE | Age: 48
End: 2023-12-04
Payer: COMMERCIAL

## 2023-12-04 VITALS
OXYGEN SATURATION: 98 % | HEART RATE: 72 BPM | RESPIRATION RATE: 24 BRPM | WEIGHT: 111.8 LBS | TEMPERATURE: 98.4 F | SYSTOLIC BLOOD PRESSURE: 113 MMHG | DIASTOLIC BLOOD PRESSURE: 74 MMHG | BODY MASS INDEX: 20.45 KG/M2

## 2023-12-04 DIAGNOSIS — J20.9 ACUTE BRONCHITIS, UNSPECIFIED ORGANISM: Primary | ICD-10-CM

## 2023-12-04 PROCEDURE — 99213 OFFICE O/P EST LOW 20 MIN: CPT | Performed by: PHYSICIAN ASSISTANT

## 2023-12-04 RX ORDER — PREDNISONE 20 MG/1
40 TABLET ORAL DAILY
Qty: 10 TABLET | Refills: 0 | Status: SHIPPED | OUTPATIENT
Start: 2023-12-04 | End: 2023-12-09

## 2023-12-04 NOTE — PROGRESS NOTES
URGENT CARE VISIT:    SUBJECTIVE:   Aria Cuevas is a 48 year old female presenting with a chief complaint of cough - non-productive and cough - productive.  Onset was 10 week(s) ago.   She denies the following symptoms: stuffy nose, shortness of breath, sore throat, vomiting, and diarrhea  Course of illness is improving.    Treatment measures tried include Inhaler (name: albuterol) and tessalon perles with some relief of symptoms.  Predisposing factors include None.    PMH:   Past Medical History:   Diagnosis Date    Iliac fossa pain, right      Allergies: No known drug allergy   Medications:   Current Outpatient Medications   Medication Sig Dispense Refill    albuterol (PROAIR HFA/PROVENTIL HFA/VENTOLIN HFA) 108 (90 Base) MCG/ACT inhaler Inhale 2 puffs into the lungs every 4 hours as needed for shortness of breath, wheezing or cough 18 g 0    predniSONE (DELTASONE) 20 MG tablet Take 2 tablets (40 mg) by mouth daily for 5 days 10 tablet 0     Social History:   Social History     Tobacco Use    Smoking status: Never    Smokeless tobacco: Never   Substance Use Topics    Alcohol use: Yes     Comment: moderate       ROS:  Review of systems negative except as stated above.    OBJECTIVE:  /74 (BP Location: Right arm, Patient Position: Sitting, Cuff Size: Adult Regular)   Pulse 72   Temp 98.4  F (36.9  C) (Oral)   Resp 24   Wt 50.7 kg (111 lb 12.8 oz)   LMP 11/27/2023 (Approximate)   SpO2 98%   BMI 20.45 kg/m    GENERAL APPEARANCE: healthy, alert and no distress  EYES: EOMI,  PERRL, conjunctiva clear  HENT: ear canals and TM's normal.  Nose and mouth without ulcers, erythema or lesions  NECK: supple, nontender, no lymphadenopathy  RESP: lungs clear to auscultation - no rales, rhonchi or wheezes  CV: regular rates and rhythm, normal S1 S2, no murmur noted  SKIN: no suspicious lesions or rashes      ASSESSMENT:    ICD-10-CM    1. Acute bronchitis, unspecified organism  J20.9 predniSONE (DELTASONE) 20 MG  tablet          PLAN:  Patient Instructions   Patient was educated on the natural course of viral condition. Take medications as directed. Side effects discussed. Conservative measures discussed including rest, increased fluids, humidifier, and over-the-counter cough suppressants. See your primary care provider if symptoms do not improve in 7 days. Seek emergency care if you develop shortness of breath or fever over 103.    Patient verbalized understanding and is agreeable to plan. The patient was discharged ambulatory and in stable condition.    Luz Garza PA-C ....................  12/4/2023   4:47 PM

## 2023-12-04 NOTE — PATIENT INSTRUCTIONS
Patient was educated on the natural course of viral condition. Take medications as directed. Side effects discussed. Conservative measures discussed including rest, increased fluids, humidifier, and over-the-counter cough suppressants. See your primary care provider if symptoms do not improve in 7 days. Seek emergency care if you develop shortness of breath or fever over 103.

## 2023-12-14 DIAGNOSIS — R05.9 COUGH, UNSPECIFIED TYPE: ICD-10-CM

## 2023-12-20 RX ORDER — ALBUTEROL SULFATE 90 UG/1
2 AEROSOL, METERED RESPIRATORY (INHALATION) EVERY 4 HOURS PRN
Qty: 18 G | Refills: 0 | Status: SHIPPED | OUTPATIENT
Start: 2023-12-20

## 2024-02-15 ENCOUNTER — PATIENT OUTREACH (OUTPATIENT)
Dept: CARE COORDINATION | Facility: CLINIC | Age: 49
End: 2024-02-15
Payer: COMMERCIAL

## 2024-03-08 ENCOUNTER — HOSPITAL ENCOUNTER (EMERGENCY)
Facility: CLINIC | Age: 49
Discharge: HOME | End: 2024-03-08
Attending: EMERGENCY MEDICINE | Admitting: EMERGENCY MEDICINE
Payer: COMMERCIAL

## 2024-03-08 VITALS
SYSTOLIC BLOOD PRESSURE: 137 MMHG | RESPIRATION RATE: 18 BRPM | HEART RATE: 67 BPM | TEMPERATURE: 97.8 F | BODY MASS INDEX: 19.88 KG/M2 | DIASTOLIC BLOOD PRESSURE: 69 MMHG | HEIGHT: 62 IN | WEIGHT: 108 LBS | OXYGEN SATURATION: 98 %

## 2024-03-08 DIAGNOSIS — S61.412A LACERATION OF LEFT PALM, INITIAL ENCOUNTER: ICD-10-CM

## 2024-03-08 PROCEDURE — 12002 RPR S/N/AX/GEN/TRNK2.6-7.5CM: CPT

## 2024-03-08 PROCEDURE — 99283 EMERGENCY DEPT VISIT LOW MDM: CPT

## 2024-03-08 RX ORDER — LIDOCAINE HYDROCHLORIDE AND EPINEPHRINE 10; 10 MG/ML; UG/ML
INJECTION, SOLUTION INFILTRATION; PERINEURAL
Status: DISCONTINUED
Start: 2024-03-08 | End: 2024-03-09 | Stop reason: HOSPADM

## 2024-03-08 ASSESSMENT — COLUMBIA-SUICIDE SEVERITY RATING SCALE - C-SSRS
2. HAVE YOU ACTUALLY HAD ANY THOUGHTS OF KILLING YOURSELF IN THE PAST MONTH?: NO
1. IN THE PAST MONTH, HAVE YOU WISHED YOU WERE DEAD OR WISHED YOU COULD GO TO SLEEP AND NOT WAKE UP?: NO
6. HAVE YOU EVER DONE ANYTHING, STARTED TO DO ANYTHING, OR PREPARED TO DO ANYTHING TO END YOUR LIFE?: NO

## 2024-03-08 ASSESSMENT — ACTIVITIES OF DAILY LIVING (ADL): ADLS_ACUITY_SCORE: 33

## 2024-03-09 NOTE — ED PROVIDER NOTES
"[facility] ED Provider Note  [facility] Emergency Department  1:06 AM  3/9/2024    Aria Cuevas  48 year oldfemale    Chief Complaint   Patient presents with    Laceration       HPI:    48-year-old female right-hand-dominant with last tetanus in 2019 presenting with a left palm laceration that occurred about 4 hours ago while cutting vegetables.  Denies any numbness distal to the wound.  Denies any other injuries.  Denies this was intentional.  Is not on anticoagulants.  Never saw pulsatile blood flow.          Independent Historian:     None     Review of External Notes:     None           ROS: ROS is negative other than mentioned above in HPI      Pertinent PMH/PSH:     none         Current Outpatient Medications   Medication Instructions    albuterol (PROAIR HFA/PROVENTIL HFA/VENTOLIN HFA) 108 (90 Base) MCG/ACT inhaler 2 puffs, Inhalation, EVERY 4 HOURS PRN             Allergies   Allergen Reactions    No Known Drug Allergy          Physical Exam  /69   Pulse 67   Temp 97.8  F (36.6  C) (Temporal)   Resp 18   Ht 1.575 m (5' 2\")   Wt 49 kg (108 lb)   LMP 03/07/2024 (Approximate)   SpO2 98%   BMI 19.75 kg/m        Extremity: Left pulm just proximal to the fifth MCP there is a horizontally oriented approximately 4 cm wound that extends into the dermis with exposed subcutaneous fat but does not extend into the musculotendinous compartment.  There is no visible foreign body when examined through range of motion.  FDS FDP intact distal sensation intact to light touch, distal perfusion intact with cap refill of less than 3 seconds in the finger being warm and symmetric to the contralateral side.    CV: ppi, regular   Resp: speaking in full sentences without any resp distress  Skin: warm dry well perfused  Neuro: Alert, no gross motor or sensory deficits,  gait stable        Labs and Imaging:    Labs Ordered and Resulted from Time of ED Arrival to Time of ED Departure - No data to display      No " orders to display           Medications Administered in ED:  Medications - No data to display        ED Course:         Laceration Repair      Procedure: Laceration Repair    Indication: Laceration    Consent: Verbal    Location: Left palm just proximal to the left fifth MCP    Length: 4 cm    Preparation: Irrigation with Sterile Saline.    Anesthesia/Sedation: Lidocaine with Epinephrine - 1%      Treatment/Exploration: Wound explored, no foreign bodies found     Closure: The wound was closed with one layer. Skin/superficial layer was closed with 3 x 4-0 Polypropylene (prolene)  using Interrupted sutures.     Patient Status: The patient tolerated the procedure well: Yes. There were no complications.        Independent Interpretation (X-rays, CTs, rhythm strip):          Consultations/Discussion of Management or Tests:       Social Determinants of Health affecting care:               Medical Decision Makin-year-old female with simple laceration to the left palm.  No evidence of neurovascular or tendon involvement.  Wound explored no foreign body, no indication for radiograph.  Low risk for infection, tetanus up-to-date.  Repaired as above.  Discussed wound care as well as signs of infection they are comfortable agreeable discharge home.  Sutures out in 10 to 14 days.      Diagnosis:    ICD-10-CM    1. Laceration of left palm, initial encounter  S61.412A           Disposition:  home      Danish Hall MD  [facility]  [facility]       Danish Hall MD  [phone]

## 2024-03-09 NOTE — DISCHARGE INSTRUCTIONS
Please make an appointment to follow up with your primary care provider or the emergency department in 10-14 days for wound check and suture removal.     Return to ER immediately if you develop: signs of a wound infection (RED/SWOLLEN/DRAINS PUS LIKE A PIMPLE) OR you have any other concerns about your health.    Antibiotic ointment over the wound for 7 days.        Discharge Instructions  Laceration (Cut)    You were seen today for a laceration (cut).  Your doctor examined your laceration for any problems such a buried foreign body (like glass, a splinter, or gravel), or injury to blood vessels, tendons, and nerves.  Your doctor may have also rinsed and/or scrubbed your laceration to help prevent an infection.  Your laceration may have been closed with glue, staples or sutures (stitches).      It may not be possible to find all problems with your laceration on the first visit, and we can't always prevent infections.  Antibiotics are only given when the benefit is more than the risk, and don't prevent all infections. Some lacerations are too high risk to close, and are left open to heal.  All lacerations, no matter how expertly repaired, will cause scarring.    Return to the Emergency Department right away if:  You have more redness, swelling, pain, drainage (pus), a bad smell, or red streaking from your laceration.    You have a fever of 101oF or more.  You have bleeding that you can t stop at home. If your cut starts to bleed, hold pressure on the bleeding area with a clean cloth or put pressure over the bandage.  If the bleeding doesn t stop after using constant pressure for 30 minutes, you should return to the Emergency Department for further treatment.  An area past the laceration is cool, pale, or blue compared with the other side, or has a slower return of color when squeezed.  Your dressing seems too tight or starts to get uncomfortable or painful.  You have loss of normal function or use of an area, such as  being unable to straighten or bend a finger normally.  You have a numb area past the laceration.    Return to the Emergency Department or see your regular doctor if:  The laceration starts to come open.   You have something coming out of the cut or a feeling that there is something in the laceration.  Your wound will not heal, or keeps breaking open. There can always be glass, wood, dirt or other things in any wound.  They won t always show up even on x-rays.  If a wound doesn t heal, this may be why, and it is important to follow-up with your regular doctor    Home Care:  Take your dressing off in 12 hours, or as instructed by your doctor, to check your laceration. Remove the dressing sooner if it seems too tight or painful, or if it is getting numb, tingly, or pale past the dressing.  Gently wash your laceration 2 times a day with clean cloth and soap.   It is okay to shower, but do not let the laceration soak in water.    If your laceration was closed with wound adhesive or strips: pat it dry and leave it open to the air.   For all other repairs: after you wash your laceration, or at least 2 times a day, apply bacitracin or other antibiotic ointment to the laceration, then cover it with a band-aid or gauze.  Keep the laceration clean. Wear gloves or other protective clothing if you are around dirt.    Follow-up:  You need to follow-up with your regular doctor in  10-14 days.  Your sutures or staples need to be removed in 10-14 days. Schedule an appointment with your regular doctor (or return to the Emergency Department) to have this done.    Scars:  To help minimize scarring:  Wear sunscreen over the healed laceration when out in the sun.  Massage the area regularly.  You may use Vitamin E Oil.  Wait a year.  Most scars will start to fade within a year.      Remember that you can always come back to the Emergency Department if you are not able to see your normal doctor in the amount of time listed above, if you  get any new symptoms, or if there is anything that worries you.

## 2024-03-14 ENCOUNTER — TRANSFERRED RECORDS (OUTPATIENT)
Dept: HEALTH INFORMATION MANAGEMENT | Facility: CLINIC | Age: 49
End: 2024-03-14

## 2024-06-01 ENCOUNTER — HEALTH MAINTENANCE LETTER (OUTPATIENT)
Age: 49
End: 2024-06-01

## 2024-09-12 ENCOUNTER — PATIENT OUTREACH (OUTPATIENT)
Dept: CARE COORDINATION | Facility: CLINIC | Age: 49
End: 2024-09-12
Payer: COMMERCIAL

## 2024-10-12 ENCOUNTER — NURSE TRIAGE (OUTPATIENT)
Dept: NURSING | Facility: CLINIC | Age: 49
End: 2024-10-12
Payer: COMMERCIAL

## 2024-10-12 NOTE — TELEPHONE ENCOUNTER
"Has had increasing episodes of feeling she may faint.  She has fainted a couple times.  Now she knows what this feels like and hasn't fainted and sits etc when the feeling comes.  Recently was driving and had an episode of nearly fainting.  It scared her.  She's uncomfortable driving now.    Provider consult indicated.     Reason for page: 2lt    Called and connected to Dr. Abimael Guidry by Answering Service at 1:05pm.     Per the protocol, I spoke to Dr Guidry.   His recommendation was to be seen in Clinic vs UC, the first part of next week.  If she has more episodes before she's seen then she needs to go to an ER.    I gave Aria this information.  Caller stated understanding and agreement.  I transfered her to scheduling.      Reason for Disposition   [1] All other patients AND [2] now alert and feels fine  (Exception: SIMPLE FAINT due to stress, pain, prolonged standing, or suddenly standing)    Additional Information   Negative: Still unconscious   Negative: Difficult to awaken or acting confused (e.g., disoriented, slurred speech)   Negative: Shock suspected (e.g., cold/pale/clammy skin, too weak to stand, low BP, rapid pulse)   Negative: Difficulty breathing   Negative: Bluish (or gray) lips or face now   Negative: Chest pain   Negative: Extra heartbeats, irregular heart beating, or heart is beating very fast  (i.e., \"palpitations\")   Negative: Bleeding (e.g., vomiting blood, rectal bleeding or tarry stools, severe vaginal bleeding)  (Exception: Fainted from sight of small amount of blood; small cut or abrasion.)   Negative: Fainted suddenly after medicine, allergic food or bee sting   Negative: Age > 50 years  (Exception: Occurred > 1 hour ago AND now feels completely fine.)   Negative: History of heart problems (e.g., congestive heart failure, heart attack)   Negative: [1] Fainted > 15 minutes ago AND [2] still feels too weak or dizzy to stand   Negative: Sounds like a life-threatening emergency to the " triager   Negative: [1] Diabetes mellitus AND [2] fainting from low blood glucose (i.e.,  70 mg/dl [3.9 mmol/l] or below)   Negative: Seizure suspected (e.g., muscle jerking or shaking followed by confusion)   Negative: Heat exhaustion suspected (i.e., dehydration from heat exposure)   Negative: [1] Fainted > 15 minutes ago AND [2] still looks pale (pale skin, pallor)   Negative: [1] Fainted > 15 minutes ago AND [2] still feels weak or dizzy   Negative: Occurred during exercise   Negative: Any head or face injury   Negative: Pregnant or possibly pregnant   Negative: Fainted 2 times in one day   Negative: [1] Drinking very little AND [2] dehydration suspected (e.g., no urine > 12 hours, very dry mouth, very lightheaded)   Negative: [1] Age > 50 years  AND [2] now alert and feels fine   Negative: Patient sounds very sick or weak to the triager    Protocols used: [id number] CORRAL RN [facility]

## 2024-10-21 ENCOUNTER — OFFICE VISIT (OUTPATIENT)
Dept: FAMILY MEDICINE | Facility: CLINIC | Age: 49
End: 2024-10-21
Payer: COMMERCIAL

## 2024-10-21 ENCOUNTER — ORDERS ONLY (AUTO-RELEASED) (OUTPATIENT)
Dept: FAMILY MEDICINE | Facility: CLINIC | Age: 49
End: 2024-10-21

## 2024-10-21 VITALS
HEART RATE: 71 BPM | DIASTOLIC BLOOD PRESSURE: 70 MMHG | BODY MASS INDEX: 20.24 KG/M2 | RESPIRATION RATE: 16 BRPM | SYSTOLIC BLOOD PRESSURE: 100 MMHG | WEIGHT: 110 LBS | HEIGHT: 62 IN | TEMPERATURE: 98.5 F | OXYGEN SATURATION: 99 %

## 2024-10-21 DIAGNOSIS — R55 PRE-SYNCOPE: Primary | ICD-10-CM

## 2024-10-21 DIAGNOSIS — Z12.11 COLON CANCER SCREENING: ICD-10-CM

## 2024-10-21 DIAGNOSIS — Z12.31 ENCOUNTER FOR SCREENING MAMMOGRAM FOR BREAST CANCER: ICD-10-CM

## 2024-10-21 DIAGNOSIS — R55 PRE-SYNCOPE: ICD-10-CM

## 2024-10-21 LAB
ALBUMIN SERPL BCG-MCNC: 4.2 G/DL (ref 3.5–5.2)
ALP SERPL-CCNC: 40 U/L (ref 40–150)
ALT SERPL W P-5'-P-CCNC: 14 U/L (ref 0–50)
ANION GAP SERPL CALCULATED.3IONS-SCNC: 9 MMOL/L (ref 7–15)
AST SERPL W P-5'-P-CCNC: 25 U/L (ref 0–45)
BILIRUB SERPL-MCNC: 0.4 MG/DL
BUN SERPL-MCNC: 9.5 MG/DL (ref 6–20)
CALCIUM SERPL-MCNC: 9 MG/DL (ref 8.8–10.4)
CHLORIDE SERPL-SCNC: 105 MMOL/L (ref 98–107)
CHOLEST SERPL-MCNC: 182 MG/DL
CREAT SERPL-MCNC: 0.69 MG/DL (ref 0.51–0.95)
EGFRCR SERPLBLD CKD-EPI 2021: >90 ML/MIN/1.73M2
ERYTHROCYTE [DISTWIDTH] IN BLOOD BY AUTOMATED COUNT: 13.2 % (ref 10–15)
EST. AVERAGE GLUCOSE BLD GHB EST-MCNC: 100 MG/DL
FASTING STATUS PATIENT QL REPORTED: YES
FASTING STATUS PATIENT QL REPORTED: YES
GLUCOSE SERPL-MCNC: 86 MG/DL (ref 70–99)
HBA1C MFR BLD: 5.1 % (ref 0–5.6)
HCO3 SERPL-SCNC: 24 MMOL/L (ref 22–29)
HCT VFR BLD AUTO: 36 % (ref 35–47)
HDLC SERPL-MCNC: 68 MG/DL
HGB BLD-MCNC: 11.9 G/DL (ref 11.7–15.7)
LDLC SERPL CALC-MCNC: 102 MG/DL
MCH RBC QN AUTO: 29.8 PG (ref 26.5–33)
MCHC RBC AUTO-ENTMCNC: 33.1 G/DL (ref 31.5–36.5)
MCV RBC AUTO: 90 FL (ref 78–100)
NONHDLC SERPL-MCNC: 114 MG/DL
PLATELET # BLD AUTO: 230 10E3/UL (ref 150–450)
POTASSIUM SERPL-SCNC: 4.2 MMOL/L (ref 3.4–5.3)
PROT SERPL-MCNC: 7.1 G/DL (ref 6.4–8.3)
RBC # BLD AUTO: 3.99 10E6/UL (ref 3.8–5.2)
SODIUM SERPL-SCNC: 138 MMOL/L (ref 135–145)
TRIGL SERPL-MCNC: 60 MG/DL
TSH SERPL DL<=0.005 MIU/L-ACNC: 2.01 UIU/ML (ref 0.3–4.2)
WBC # BLD AUTO: 3.7 10E3/UL (ref 4–11)

## 2024-10-21 PROCEDURE — 85027 COMPLETE CBC AUTOMATED: CPT | Performed by: FAMILY MEDICINE

## 2024-10-21 PROCEDURE — 83036 HEMOGLOBIN GLYCOSYLATED A1C: CPT | Performed by: FAMILY MEDICINE

## 2024-10-21 PROCEDURE — 84443 ASSAY THYROID STIM HORMONE: CPT | Performed by: FAMILY MEDICINE

## 2024-10-21 PROCEDURE — 80061 LIPID PANEL: CPT | Performed by: FAMILY MEDICINE

## 2024-10-21 PROCEDURE — 36415 COLL VENOUS BLD VENIPUNCTURE: CPT | Performed by: FAMILY MEDICINE

## 2024-10-21 PROCEDURE — 99214 OFFICE O/P EST MOD 30 MIN: CPT | Performed by: FAMILY MEDICINE

## 2024-10-21 PROCEDURE — 80053 COMPREHEN METABOLIC PANEL: CPT | Performed by: FAMILY MEDICINE

## 2024-10-21 NOTE — PROGRESS NOTES
Assessment & Plan     Pre-syncope  In the last 6 weeks, 3 isolated events consistent with presyncope.  Based on history suspect neurocardiogenic syncope.  Check labs, recommend 2-week cardiac monitoring; if both results are normal, recommend neurology consultation.  - Lipid panel reflex to direct LDL Non-fasting  - CBC with platelets  - Comprehensive metabolic panel (BMP + Alb, Alk Phos, ALT, AST, Total. Bili, TP)  - TSH with free T4 reflex  - Hemoglobin A1c  - ZIO PATCH MAIL OUT  - Lipid panel reflex to direct LDL Non-fasting  - CBC with platelets  - Comprehensive metabolic panel (BMP + Alb, Alk Phos, ALT, AST, Total. Bili, TP)  - TSH with free T4 reflex  - Hemoglobin A1c    Colon cancer screening  Sign record release from [facility]    Encounter for screening mammogram for breast cancer  Sign record release from Ob Gyn                  Christine   Aria is a 49 year old, presenting for the following health issues:  Consult (Near syncope episodes in the last 6 weeks)        10/21/2024     8:46 AM   Additional Questions   Roomed by Aminta Mccullough     History of Present Illness       Reason for visit:  Faint   She is taking medications regularly.         Dizziness  Onset/Duration: x6 weeks  Description:   Do you feel faint: YES  Does it feel like the surroundings (bed, room) are moving: No  Unsteady/off balance: No  Have you passed out or fallen: No  Intensity: moderate  Progression of Symptoms: intermittent  Accompanying Signs & Symptoms:  Heart palpitations or chest pain: No  Nausea, vomiting: No  Weakness or lack of coordination in arms or legs: YES  Vision or speech changes: No  Numbness or tingling: YES  Ringing in ears (Tinnitus): No  Hearing Loss: No  History:   Head trauma/concussion history: No  Previous similar symptoms: YES  Recent bleeding history: No  Any new medications (BP?): No  Precipitating factors:   Worse with activity: N/A  Worse with head movement: N/A  Alleviating factors:   Does staying  "in a fixed position give relief: no   Therapies tried and outcome: None      3x isolated events of the last 6 weeks.  Once occurred when she was driving had to pull over.  30 minutes of lightheadedness.  Ate some food.  Sister had to take over driving.  Another time occurred when she was tailgating, had to eat some food again with improvement in sleep.  The last event was associated sated with feelings of increased warmth and sweating, denies any palpitations chest pain at the time.  The last event occurred when she was gardening and she believes is from position changes.        At those events;, no complete loss of consciousness.  Associated feelings of numbness and tingling in her fingers and hands.    Additionaly gets a frontal headache right behind her eyes and forehead.  No photophobia or phonophobia.  Weakening symptoms, headache not worse with intercourse.    Objective    /70 (BP Location: Right arm, Patient Position: Chair, Cuff Size: Adult Regular)   Pulse 71   Temp 98.5  F (36.9  C) (Oral)   Resp 16   Ht 1.575 m (5' 2\")   Wt 49.9 kg (110 lb)   LMP 09/23/2024   SpO2 99%   BMI 20.12 kg/m    Body mass index is 20.12 kg/m .  Physical Exam  Constitutional:       Appearance: She is not ill-appearing.   Eyes:      Pupils: Pupils are equal, round, and reactive to light.   Neck:      Comments: No thyromegly.   Cardiovascular:      Rate and Rhythm: Normal rate and regular rhythm.   Pulmonary:      Effort: Pulmonary effort is normal.   Neurological:      Mental Status: She is alert.   Psychiatric:         Thought Content: Thought content normal.         Judgment: Judgment normal.            Results for orders placed or performed in visit on 10/21/24 (from the past 24 hour(s))   CBC with platelets   Result Value Ref Range    WBC Count 3.7 (L) 4.0 - 11.0 10e3/uL    RBC Count 3.99 3.80 - 5.20 10e6/uL    Hemoglobin 11.9 11.7 - 15.7 g/dL    Hematocrit 36.0 35.0 - 47.0 %    MCV 90 78 - 100 fL    MCH 29.8 26.5 " - 33.0 pg    MCHC 33.1 31.5 - 36.5 g/dL    RDW 13.2 10.0 - 15.0 %    Platelet Count 230 150 - 450 10e3/uL           Signed Electronically by: Matthew Lux MD

## 2024-10-24 ENCOUNTER — TELEPHONE (OUTPATIENT)
Dept: FAMILY MEDICINE | Facility: CLINIC | Age: 49
End: 2024-10-24

## 2024-10-24 NOTE — TELEPHONE ENCOUNTER
Patient Quality Outreach    Patient is due for the following:   Colon Cancer Screening  Breast Cancer Screening - Mammogram    Next Steps:   Chart routed to abstraction.      Type of outreach:    Received colonoscopy from [facility]    Report was sent to be scanned  Questions for provider review:    None         Mammo results from [facility] Sent to to be scanned  Nuvia Luz CMA

## 2024-10-31 ENCOUNTER — PATIENT OUTREACH (OUTPATIENT)
Dept: GASTROENTEROLOGY | Facility: CLINIC | Age: 49
End: 2024-10-31
Payer: COMMERCIAL

## 2024-12-04 ENCOUNTER — ANCILLARY PROCEDURE (OUTPATIENT)
Dept: GENERAL RADIOLOGY | Facility: CLINIC | Age: 49
End: 2024-12-04
Attending: PHYSICIAN ASSISTANT
Payer: COMMERCIAL

## 2024-12-04 ENCOUNTER — TELEPHONE (OUTPATIENT)
Dept: NURSING | Facility: CLINIC | Age: 49
End: 2024-12-04
Payer: COMMERCIAL

## 2024-12-04 ENCOUNTER — OFFICE VISIT (OUTPATIENT)
Dept: URGENT CARE | Facility: URGENT CARE | Age: 49
End: 2024-12-04
Payer: COMMERCIAL

## 2024-12-04 VITALS
BODY MASS INDEX: 19.75 KG/M2 | HEART RATE: 81 BPM | SYSTOLIC BLOOD PRESSURE: 94 MMHG | DIASTOLIC BLOOD PRESSURE: 68 MMHG | OXYGEN SATURATION: 96 % | WEIGHT: 108 LBS | TEMPERATURE: 98.4 F

## 2024-12-04 DIAGNOSIS — R05.3 PERSISTENT COUGH FOR 3 WEEKS OR LONGER: ICD-10-CM

## 2024-12-04 DIAGNOSIS — J22 LOWER RESPIRATORY TRACT INFECTION: Primary | ICD-10-CM

## 2024-12-04 PROCEDURE — 71046 X-RAY EXAM CHEST 2 VIEWS: CPT | Mod: TC | Performed by: RADIOLOGY

## 2024-12-04 PROCEDURE — 99213 OFFICE O/P EST LOW 20 MIN: CPT | Performed by: PHYSICIAN ASSISTANT

## 2024-12-04 RX ORDER — BENZONATATE 200 MG/1
200 CAPSULE ORAL 3 TIMES DAILY PRN
Qty: 30 CAPSULE | Refills: 0 | Status: SHIPPED | OUTPATIENT
Start: 2024-12-04

## 2024-12-04 RX ORDER — AZITHROMYCIN 250 MG/1
TABLET, FILM COATED ORAL
Qty: 6 TABLET | Refills: 0 | Status: SHIPPED | OUTPATIENT
Start: 2024-12-04 | End: 2024-12-09

## 2024-12-04 NOTE — PROGRESS NOTES
Assessment & Plan     Lower respiratory tract infection  Persistent symptoms for the past 8 weeks.  On exam patient is in no acute respiratory distress.  Vitals are stable.  Fortunately today chest x-ray is negative for acute infiltrates or acute abnormalities.  Given length and worsening of symptoms we have elected to treat for presumed bacterial bronchitis.  Zithromax is prescribed.  Tessalon Perles prescribed as needed for cough.  Close monitoring of symptoms.  Follow-up if any worsening symptoms.  Patient agrees with the plan.  - azithromycin (ZITHROMAX) 250 MG tablet  Dispense: 6 tablet; Refill: 0  - benzonatate (TESSALON) 200 MG capsule  Dispense: 30 capsule; Refill: 0    Persistent cough for 3 weeks or longer  - XR Chest 2 Views       Return in about 1 week (around 12/11/2024) for Symptoms failing to improve.    Mayra Sainz PA-C  [facility] KimballKAY Knapp is a 49 year old female who presents to clinic today for the following health issues:  Chief Complaint   Patient presents with    Urgent Care     X8 weeks cough, last week flair up, getting worse in the evening, was not seen for this in the previous weeks, productive cough, fatigued, headache, occasional ear pain, low grade fever couple day ago, no covid test done, no sob,   Tried ibuprofen, saline spray, tylenol pm        HPI      URI Adult    Onset of symptoms was 8 week(s) ago.  Course of illness is worsening.    Severity moderate  Current and Associated symptoms: cough - productive,  fatigue, headache  LGF a couple days ago  Treatment measures tried include Tylenol/Ibuprofen and OTC Cough med.  Predisposing factors include None.      Review of Systems  Constitutional, HEENT, cardiovascular, pulmonary, GI, , musculoskeletal, neuro, skin, endocrine and psych systems are negative, except as otherwise noted.      Objective    BP 94/68   Pulse 81   Temp 98.4  F (36.9  C) (Tympanic)   Wt 49 kg (108 lb)   LMP  09/23/2024   SpO2 96%   BMI 19.75 kg/m    Physical Exam   GENERAL: alert and no distress  HENT: ear canals and TM's normal,  mouth without ulcers or lesions, throat is moist and pink  RESP: lungs with diffuse coarse breath sounds  CV: regular rate and rhythm, normal S1 S2  MS: no gross musculoskeletal defects noted, no edema  SKIN: no suspicious lesions or rashes    CXR - Reviewed and interpreted by me Normal- no infiltrates, effusions, pneumothoraces, cardiomegaly or masses, awaiting formal interpretation from Radiologist at this time

## 2024-12-04 NOTE — TELEPHONE ENCOUNTER
Transferred from central scheduling. Wants to be put in line for Urgent Care wait as online tell us you are on your way button is not working. Is going to be seen for cough.     Per check with  they are not able to add her. On my way feature should work after 11--patient advised can do that or come in now and  can put in line and she can wait in car.     Patient in agreement.     Lissa Tanner R.N.

## 2024-12-08 LAB — CV ZIO PRELIM RESULTS: NORMAL

## 2025-06-14 ENCOUNTER — HEALTH MAINTENANCE LETTER (OUTPATIENT)
Age: 50
End: 2025-06-14